# Patient Record
Sex: MALE | Race: WHITE | HISPANIC OR LATINO | Employment: FULL TIME | URBAN - METROPOLITAN AREA
[De-identification: names, ages, dates, MRNs, and addresses within clinical notes are randomized per-mention and may not be internally consistent; named-entity substitution may affect disease eponyms.]

---

## 2017-01-22 ENCOUNTER — HOSPITAL ENCOUNTER (EMERGENCY)
Facility: CLINIC | Age: 37
Discharge: HOME | End: 2017-01-22
Attending: EMERGENCY MEDICINE | Admitting: EMERGENCY MEDICINE
Payer: COMMERCIAL

## 2017-01-22 VITALS
OXYGEN SATURATION: 100 % | DIASTOLIC BLOOD PRESSURE: 82 MMHG | RESPIRATION RATE: 16 BRPM | TEMPERATURE: 97.8 F | SYSTOLIC BLOOD PRESSURE: 123 MMHG

## 2017-01-22 DIAGNOSIS — S46.002A ROTATOR CUFF INJURY, LEFT, INITIAL ENCOUNTER: ICD-10-CM

## 2017-01-22 DIAGNOSIS — M79.622 PAIN OF LEFT UPPER ARM: ICD-10-CM

## 2017-01-22 PROCEDURE — 93005 ELECTROCARDIOGRAM TRACING: CPT

## 2017-01-22 PROCEDURE — 99284 EMERGENCY DEPT VISIT MOD MDM: CPT

## 2017-01-22 RX ORDER — IBUPROFEN 600 MG/1
600 TABLET, FILM COATED ORAL 3 TIMES DAILY PRN
Qty: 30 TABLET | Refills: 0 | Status: SHIPPED | OUTPATIENT
Start: 2017-01-22 | End: 2018-07-04

## 2017-01-22 RX ORDER — METHOCARBAMOL 750 MG/1
750 TABLET, FILM COATED ORAL 3 TIMES DAILY PRN
Qty: 30 TABLET | Refills: 0 | Status: SHIPPED | OUTPATIENT
Start: 2017-01-22 | End: 2018-07-04

## 2017-01-22 ASSESSMENT — ENCOUNTER SYMPTOMS: MYALGIAS: 1

## 2017-01-22 NOTE — ED PROVIDER NOTES
History     Chief Complaint:  Left Arm Pain    HPI History obtained through the  phone.     Teddy Fisher is a 36 year old male who presents to the emergency department today for evaluation of left arm pain. He was seen on 11/6/16 for similar symptoms in the ED with a negative chest x-ray and negative left shoulder x-ray. The patient's left arm is having pain from the elbow up the arm into the shoulder and neck for the last 3 days. He denies any trauma or injuries to the arm recently. His pain has been intermittent for months now but seemed worse in the upper arm prompting him to come today. The patient has been taking Ibuprofen for the pain which helps but his stomach is sometimes irritated as a result. He has no past surgical history on the arm but did get an injection into the shoulder by orthopedics. The injection helped with his shoulder pain but not the arm pain. He denies chest pain or shortness of breath but does note some phlegm.  Has been working with physical therapy.     Allergies:  No Known Drug Allergies      Medications:    Ibuprofen    Past Medical History:    History reviewed. No pertinent past medical history.     Past Surgical History:    History reviewed. No pertinent past surgical history.     Family History:    History reviewed. No pertinent family history.      Social History:  The patient was accompanied to the ED by son.  Does not smoke    Review of Systems   Cardiovascular: Negative for chest pain.   Musculoskeletal: Positive for myalgias (left upper arm).   All other systems reviewed and are negative.    Physical Exam   Vitals:  Patient Vitals for the past 24 hrs:   BP Temp Temp src Heart Rate Resp SpO2   01/22/17 0850 123/82 mmHg 97.8  F (36.6  C) Oral 79 16 100 %       Physical Exam  Eyes:  Sclera white; Pupils are equal and round  ENT:    External ears and nares normal  CV:  Regular rate and rhythm, No murmur     No CW tenderness    No BLE edema  Resp:  Breath  sounds clear and equal bilaterally  GI:  Abdomen is soft, non-tender, non-distended    No rebound tenderness or peritoneal features  MS:  Moves all extremities    LUE: tenderness anterior shoulder, no rash or effusion.  Tenderness anterior and medial upper arm, no rash.  ROM limited - cannot abduct shoulder or flex beyond horizontal.  Distal CMS at elbow/wrist/hand intact    Neck: no midline or paraspinal tenderness    Back: no midline or paraspinal tenderness, no tenderness of palpable rotator cuff muscles  Skin:  Warm and dry  Neuro: Speech is normal and fluent. No apparent deficit.    Emergency Department Course     ECG:  ECG taken at 0845, ECG read at 0907  Normal sinus rhythm  Normal ECG  Rate 73 bpm. ID interval 128. QRS duration 94. QT/QTc 408/449. P-R-T axes 54 45 20.      Emergency Department Course:  Nursing notes and vitals reviewed.  I performed an exam of the patient as documented above.   I discussed the treatment plan with the patient. They expressed understanding of this plan and consented to discharge. They will be discharged home with instructions for care and follow up. In addition, the patient will return to the emergency department if their symptoms persist, worsen, if new symptoms arise or if there is any concern.  All questions were answered.   I personally reviewed the results with the patient and answered all related questions prior to discharge.    Impression & Plan      Medical Decision Making:  Patient is here for evaluation of persistent left shoulder and arm which he has been seen before. When he first presented there was concern that this could be an atypical presentation for cardiac pathology and EKG was ordered by the nurses. This returned normal and unchanged from prior. As we got more history with the  phone it is clear that he has longstanding shoulder issues and is simply here because pain is now also felt in arm and back. He has limited range of motion at the shoulder  consistent with rotator cuff dysfunction. I suspect that he is getting some muscle spasms around this area as well. He will be continuing Ibuprofen but we will be changing the dosing for him and adding Ranitidine for GI protection and Methocarbamol for muscle relaxation. He will have close follow up with his orthopedic group. There are no injuries to suggest fracture and exam is not consistent with dislocation. X-rays would not be helpful.     Diagnosis:    ICD-10-CM    1. Pain of left upper arm M79.622    2. Rotator cuff injury, left, initial encounter S46.002A      Disposition:   Discharge    Discharge Medications:  New Prescriptions    IBUPROFEN (ADVIL/MOTRIN) 600 MG TABLET    Take 1 tablet (600 mg) by mouth 3 times daily as needed for pain    METHOCARBAMOL (ROBAXIN) 750 MG TABLET    Take 1 tablet (750 mg) by mouth 3 times daily as needed (muscle pain/spasm)    RANITIDINE (ZANTAC) 150 MG TABLET    Take 1 tablet (150 mg) by mouth 2 times daily (protect stomach)     Scribe Disclosure:  I, Wale Kauffman, am serving as a scribe at 8:42 AM on 1/22/2017 to document services personally performed by Sonia Pollock, *, based on my observations and the provider's statements to me.   1/22/2017   [facility] EMERGENCY DEPARTMENT        Sonia Pollock MD  01/22/17 0949

## 2017-01-22 NOTE — ED AVS SNAPSHOT
[facility] Emergency Department    [address]    [facility] [address]    Phone:  [phone]    Fax:  [phone]                                       Teddy Fisher   MRN: [id number]    Department:  [facility] Emergency Department   Date of Visit:  1/22/2017           Patient Information     Date Of Birth          1980        Your diagnoses for this visit were:     Pain of left upper arm     Rotator cuff injury, left, initial encounter        You were seen by Sonia Pollock MD.      Follow-up Information     Follow up with Orthopedic Clinic.        Follow up with [facility] Emergency Department.    Specialty:  EMERGENCY MEDICINE    Why:  As needed, If symptoms worsen    Contact information:    [address]  [facility] [address] [phone]        Discharge Instructions       Prescription strength dosing instructions:  - 600mg ibuprofen (motrin, advil) every 6 hours and/or  - 650mg acetaminophen (tylenol) every 4 hours or 1000mg every 6 hours (maximum of 4000mg in 24 hours).  Acetaminophen is often in combination over the counter and prescription pain medications.  Be sure to include this in daily total.  - These work in different ways and are safe to take together        Discharge References/Attachments     ROTATOR CUFF INJURIES, UNDERSTANDING ([place])      24 Hour Appointment Hotline       To make an appointment at any [facility], call [phone] ([phone]). If you don't have a family doctor or clinic, we will help you find one. [facility] are conveniently located to serve the needs of you and your family.             Review of your medicines      START taking        Dose / Directions Last dose taken    methocarbamol 750 MG tablet   Commonly known as:  ROBAXIN   Dose:  750 mg   Quantity:  30 tablet        Take 1 tablet (750 mg) by mouth 3 times daily as needed (muscle pain/spasm)   Refills:   0        ranitidine 150 MG tablet   Commonly known as:  ZANTAC   Dose:  150 mg   Quantity:  30 tablet        Take 1 tablet (150 mg) by mouth 2 times daily (protect stomach)   Refills:  0          CONTINUE these medicines which may have CHANGED, or have new prescriptions. If we are uncertain of the size of tablets/capsules you have at home, strength may be listed as something that might have changed.        Dose / Directions Last dose taken    ibuprofen 600 MG tablet   Commonly known as:  ADVIL/MOTRIN   Dose:  600 mg   What changed:    - medication strength  - how much to take  - when to take this  - reasons to take this   Quantity:  30 tablet        Take 1 tablet (600 mg) by mouth 3 times daily as needed for pain   Refills:  0                Prescriptions were sent or printed at these locations (3 Prescriptions)                   Other Prescriptions                Printed at Department/Unit printer (3 of 3)         ibuprofen (ADVIL/MOTRIN) 600 MG tablet               ranitidine (ZANTAC) 150 MG tablet               methocarbamol (ROBAXIN) 750 MG tablet                Procedures and tests performed during your visit     EKG 12 lead      Orders Needing Specimen Collection     None      Pending Results     Date and Time Order Name Status Description    1/22/2017 0847 EKG 12 lead Preliminary             Pending Culture Results     No orders found from 1/21/2017 to 1/23/2017.             Test Results from your hospital stay            Clinical Quality Measure: Blood Pressure Screening     Your blood pressure was checked while you were in the emergency department today. The last reading we obtained was  BP: 123/82 mmHg . Please read the guidelines below about what these numbers mean and what you should do about them.  If your systolic blood pressure (the top number) is less than 120 and your diastolic blood pressure (the bottom number) is less than 80, then your blood pressure is normal. There is nothing more that you need  "to do about it.  If your systolic blood pressure (the top number) is 120-139 or your diastolic blood pressure (the bottom number) is 80-89, your blood pressure may be higher than it should be. You should have your blood pressure rechecked within a year by a primary care provider.  If your systolic blood pressure (the top number) is 140 or greater or your diastolic blood pressure (the bottom number) is 90 or greater, you may have high blood pressure. High blood pressure is treatable, but if left untreated over time it can put you at risk for heart attack, stroke, or kidney failure. You should have your blood pressure rechecked by a primary care provider within the next 4 weeks.  If your provider in the emergency department today gave you specific instructions to follow-up with your doctor or provider even sooner than that, you should follow that instruction and not wait for up to 4 weeks for your follow-up visit.        Thank you for choosing [facility]       Thank you for choosing [facility] for your care. Our goal is always to provide you with excellent care. Hearing back from our patients is one way we can continue to improve our services. Please take a few minutes to complete the written survey that you may receive in the mail after you visit with us. Thank you!        [employer] Information     [employer] lets you send messages to your doctor, view your test results, renew your prescriptions, schedule appointments and more. To sign up, go to [email or web address] . Click on \"Log in\" on the left side of the screen, which will take you to the Welcome page. Then click on \"Sign up Now\" on the right side of the page.     You will be asked to enter the access code listed below, as well as some personal information. Please follow the directions to create your username and password.     Your access code is: [email or web address]  Expires: 2017  2:20 PM     Your access code will  in 90 days. If you need help or a new code, " please call your [place] clinic or [phone].        Care EveryWhere ID     This is your Care EveryWhere ID. This could be used by other organizations to access your [facility] medical records  [id number]        After Visit Summary       This is your record. Keep this with you and show to your community pharmacist(s) and doctor(s) at your next visit.

## 2017-01-22 NOTE — DISCHARGE INSTRUCTIONS
Prescription strength dosing instructions:  - 600mg ibuprofen (motrin, advil) every 6 hours and/or  - 650mg acetaminophen (tylenol) every 4 hours or 1000mg every 6 hours (maximum of 4000mg in 24 hours).  Acetaminophen is often in combination over the counter and prescription pain medications.  Be sure to include this in daily total.  - These work in different ways and are safe to take together

## 2017-01-22 NOTE — ED AVS SNAPSHOT
[facility] Emergency Department    [address]    [facility] [address]    Phone:  [phone]    Fax:  [phone]                                       Teddy Fisher   MRN: [id number]    Department:  [facility] Emergency Department   Date of Visit:  1/22/2017           After Visit Summary Signature Page     I have received my discharge instructions, and my questions have been answered. I have discussed any challenges I see with this plan with the nurse or doctor.    ..........................................................................................................................................  Patient/Patient Representative Signature      ..........................................................................................................................................  Patient Representative Print Name and Relationship to Patient    ..................................................               ................................................  Date                                            Time    ..........................................................................................................................................  Reviewed by Signature/Title    ...................................................              ..............................................  Date                                                            Time

## 2017-01-22 NOTE — ED NOTES
Complains of left upper arm pain.  Denies injury.  Denies chest pain.  Patient was here in November for same complaint.  ABCDs intact.

## 2017-01-23 LAB — INTERPRETATION ECG - MUSE: NORMAL

## 2017-04-17 ENCOUNTER — ANESTHESIA (OUTPATIENT)
Dept: SURGERY | Facility: CLINIC | Age: 37
End: 2017-04-17
Payer: COMMERCIAL

## 2017-04-17 ENCOUNTER — ANESTHESIA EVENT (OUTPATIENT)
Dept: SURGERY | Facility: CLINIC | Age: 37
End: 2017-04-17
Payer: COMMERCIAL

## 2017-04-17 ENCOUNTER — HOSPITAL ENCOUNTER (OUTPATIENT)
Facility: CLINIC | Age: 37
Discharge: HOME | End: 2017-04-17
Attending: ORTHOPAEDIC SURGERY | Admitting: ORTHOPAEDIC SURGERY
Payer: COMMERCIAL

## 2017-04-17 VITALS
BODY MASS INDEX: 25.91 KG/M2 | DIASTOLIC BLOOD PRESSURE: 89 MMHG | TEMPERATURE: 97.7 F | HEART RATE: 68 BPM | WEIGHT: 171 LBS | RESPIRATION RATE: 14 BRPM | OXYGEN SATURATION: 97 % | HEIGHT: 68 IN | SYSTOLIC BLOOD PRESSURE: 129 MMHG

## 2017-04-17 DIAGNOSIS — M75.42 SHOULDER IMPINGEMENT SYNDROME, LEFT: Primary | ICD-10-CM

## 2017-04-17 PROCEDURE — 25800025 ZZH RX 258: Performed by: ORTHOPAEDIC SURGERY

## 2017-04-17 PROCEDURE — 37000009 ZZH ANESTHESIA TECHNICAL FEE, EACH ADDTL 15 MIN: Performed by: ORTHOPAEDIC SURGERY

## 2017-04-17 PROCEDURE — 40000171 ZZH STATISTIC PRE-PROCEDURE ASSESSMENT III: Performed by: ORTHOPAEDIC SURGERY

## 2017-04-17 PROCEDURE — 36000063 ZZH SURGERY LEVEL 4 EA 15 ADDTL MIN: Performed by: ORTHOPAEDIC SURGERY

## 2017-04-17 PROCEDURE — 71000012 ZZH RECOVERY PHASE 1 LEVEL 1 FIRST HR: Performed by: ORTHOPAEDIC SURGERY

## 2017-04-17 PROCEDURE — 37000008 ZZH ANESTHESIA TECHNICAL FEE, 1ST 30 MIN: Performed by: ORTHOPAEDIC SURGERY

## 2017-04-17 PROCEDURE — 25000128 H RX IP 250 OP 636: Performed by: PHYSICIAN ASSISTANT

## 2017-04-17 PROCEDURE — 25000128 H RX IP 250 OP 636: Performed by: NURSE ANESTHETIST, CERTIFIED REGISTERED

## 2017-04-17 PROCEDURE — 36000093 ZZH SURGERY LEVEL 4 1ST 30 MIN: Performed by: ORTHOPAEDIC SURGERY

## 2017-04-17 PROCEDURE — 25800025 ZZH RX 258: Performed by: NURSE ANESTHETIST, CERTIFIED REGISTERED

## 2017-04-17 PROCEDURE — 25000566 ZZH SEVOFLURANE, EA 15 MIN: Performed by: ORTHOPAEDIC SURGERY

## 2017-04-17 PROCEDURE — 25000125 ZZHC RX 250: Performed by: NURSE ANESTHETIST, CERTIFIED REGISTERED

## 2017-04-17 PROCEDURE — 71000027 ZZH RECOVERY PHASE 2 EACH 15 MINS: Performed by: ORTHOPAEDIC SURGERY

## 2017-04-17 PROCEDURE — 27210794 ZZH OR GENERAL SUPPLY STERILE: Performed by: ORTHOPAEDIC SURGERY

## 2017-04-17 RX ORDER — SODIUM CHLORIDE, SODIUM LACTATE, POTASSIUM CHLORIDE, CALCIUM CHLORIDE 600; 310; 30; 20 MG/100ML; MG/100ML; MG/100ML; MG/100ML
INJECTION, SOLUTION INTRAVENOUS CONTINUOUS PRN
Status: DISCONTINUED | OUTPATIENT
Start: 2017-04-17 | End: 2017-04-17

## 2017-04-17 RX ORDER — FENTANYL CITRATE 50 UG/ML
25-50 INJECTION, SOLUTION INTRAMUSCULAR; INTRAVENOUS
Status: DISCONTINUED | OUTPATIENT
Start: 2017-04-17 | End: 2017-04-17 | Stop reason: HOSPADM

## 2017-04-17 RX ORDER — MEPERIDINE HYDROCHLORIDE 25 MG/ML
12.5 INJECTION INTRAMUSCULAR; INTRAVENOUS; SUBCUTANEOUS
Status: DISCONTINUED | OUTPATIENT
Start: 2017-04-17 | End: 2017-04-17 | Stop reason: HOSPADM

## 2017-04-17 RX ORDER — FENTANYL CITRATE 50 UG/ML
INJECTION, SOLUTION INTRAMUSCULAR; INTRAVENOUS PRN
Status: DISCONTINUED | OUTPATIENT
Start: 2017-04-17 | End: 2017-04-17

## 2017-04-17 RX ORDER — CEFAZOLIN SODIUM 2 G/100ML
2 INJECTION, SOLUTION INTRAVENOUS
Status: COMPLETED | OUTPATIENT
Start: 2017-04-17 | End: 2017-04-17

## 2017-04-17 RX ORDER — GLYCOPYRROLATE 0.2 MG/ML
INJECTION, SOLUTION INTRAMUSCULAR; INTRAVENOUS PRN
Status: DISCONTINUED | OUTPATIENT
Start: 2017-04-17 | End: 2017-04-17

## 2017-04-17 RX ORDER — HYDROMORPHONE HYDROCHLORIDE 1 MG/ML
.3-.5 INJECTION, SOLUTION INTRAMUSCULAR; INTRAVENOUS; SUBCUTANEOUS EVERY 10 MIN PRN
Status: DISCONTINUED | OUTPATIENT
Start: 2017-04-17 | End: 2017-04-17 | Stop reason: HOSPADM

## 2017-04-17 RX ORDER — ONDANSETRON 4 MG/1
4 TABLET, ORALLY DISINTEGRATING ORAL EVERY 30 MIN PRN
Status: DISCONTINUED | OUTPATIENT
Start: 2017-04-17 | End: 2017-04-17 | Stop reason: HOSPADM

## 2017-04-17 RX ORDER — CEFAZOLIN SODIUM 1 G/3ML
1 INJECTION, POWDER, FOR SOLUTION INTRAMUSCULAR; INTRAVENOUS SEE ADMIN INSTRUCTIONS
Status: DISCONTINUED | OUTPATIENT
Start: 2017-04-17 | End: 2017-04-17 | Stop reason: HOSPADM

## 2017-04-17 RX ORDER — DEXAMETHASONE SODIUM PHOSPHATE 4 MG/ML
INJECTION, SOLUTION INTRA-ARTICULAR; INTRALESIONAL; INTRAMUSCULAR; INTRAVENOUS; SOFT TISSUE PRN
Status: DISCONTINUED | OUTPATIENT
Start: 2017-04-17 | End: 2017-04-17

## 2017-04-17 RX ORDER — ONDANSETRON 2 MG/ML
INJECTION INTRAMUSCULAR; INTRAVENOUS PRN
Status: DISCONTINUED | OUTPATIENT
Start: 2017-04-17 | End: 2017-04-17

## 2017-04-17 RX ORDER — PROPOFOL 10 MG/ML
INJECTION, EMULSION INTRAVENOUS PRN
Status: DISCONTINUED | OUTPATIENT
Start: 2017-04-17 | End: 2017-04-17

## 2017-04-17 RX ORDER — ONDANSETRON 2 MG/ML
4 INJECTION INTRAMUSCULAR; INTRAVENOUS EVERY 30 MIN PRN
Status: DISCONTINUED | OUTPATIENT
Start: 2017-04-17 | End: 2017-04-17 | Stop reason: HOSPADM

## 2017-04-17 RX ORDER — NALOXONE HYDROCHLORIDE 0.4 MG/ML
.1-.4 INJECTION, SOLUTION INTRAMUSCULAR; INTRAVENOUS; SUBCUTANEOUS
Status: DISCONTINUED | OUTPATIENT
Start: 2017-04-17 | End: 2017-04-17 | Stop reason: HOSPADM

## 2017-04-17 RX ORDER — SODIUM CHLORIDE, SODIUM LACTATE, POTASSIUM CHLORIDE, CALCIUM CHLORIDE 600; 310; 30; 20 MG/100ML; MG/100ML; MG/100ML; MG/100ML
INJECTION, SOLUTION INTRAVENOUS CONTINUOUS
Status: DISCONTINUED | OUTPATIENT
Start: 2017-04-17 | End: 2017-04-17 | Stop reason: HOSPADM

## 2017-04-17 RX ORDER — OXYCODONE AND ACETAMINOPHEN 5; 325 MG/1; MG/1
1-2 TABLET ORAL EVERY 4 HOURS PRN
Qty: 75 TABLET | Refills: 0 | Status: SHIPPED | OUTPATIENT
Start: 2017-04-17 | End: 2018-07-04

## 2017-04-17 RX ORDER — LABETALOL HYDROCHLORIDE 5 MG/ML
10 INJECTION, SOLUTION INTRAVENOUS
Status: DISCONTINUED | OUTPATIENT
Start: 2017-04-17 | End: 2017-04-17 | Stop reason: HOSPADM

## 2017-04-17 RX ORDER — LIDOCAINE HYDROCHLORIDE 10 MG/ML
INJECTION, SOLUTION INFILTRATION; PERINEURAL PRN
Status: DISCONTINUED | OUTPATIENT
Start: 2017-04-17 | End: 2017-04-17

## 2017-04-17 RX ADMIN — FENTANYL CITRATE 100 MCG: 50 INJECTION, SOLUTION INTRAMUSCULAR; INTRAVENOUS at 12:17

## 2017-04-17 RX ADMIN — PROPOFOL 200 MG: 10 INJECTION, EMULSION INTRAVENOUS at 12:17

## 2017-04-17 RX ADMIN — DEXAMETHASONE SODIUM PHOSPHATE 4 MG: 4 INJECTION, SOLUTION INTRA-ARTICULAR; INTRALESIONAL; INTRAMUSCULAR; INTRAVENOUS; SOFT TISSUE at 12:17

## 2017-04-17 RX ADMIN — ONDANSETRON 4 MG: 2 INJECTION INTRAMUSCULAR; INTRAVENOUS at 12:59

## 2017-04-17 RX ADMIN — LIDOCAINE HYDROCHLORIDE 40 MG: 10 INJECTION, SOLUTION INFILTRATION; PERINEURAL at 12:17

## 2017-04-17 RX ADMIN — CEFAZOLIN SODIUM 2 G: 2 INJECTION, SOLUTION INTRAVENOUS at 12:11

## 2017-04-17 RX ADMIN — MIDAZOLAM HYDROCHLORIDE 2 MG: 1 INJECTION, SOLUTION INTRAMUSCULAR; INTRAVENOUS at 12:11

## 2017-04-17 RX ADMIN — SODIUM CHLORIDE, POTASSIUM CHLORIDE, SODIUM LACTATE AND CALCIUM CHLORIDE: 600; 310; 30; 20 INJECTION, SOLUTION INTRAVENOUS at 12:11

## 2017-04-17 RX ADMIN — GLYCOPYRROLATE 0.2 MG: 0.2 INJECTION, SOLUTION INTRAMUSCULAR; INTRAVENOUS at 12:31

## 2017-04-17 NOTE — ANESTHESIA CARE TRANSFER NOTE
Patient: Teddy Fisher    Procedure(s):  Left Shoulder Arthroscopy, Subacromial Decompression, and mini open Distal Clavicle Excision    - Wound Class: I-Clean    Diagnosis: Impingement   Diagnosis Additional Information: No value filed.    Anesthesia Type:   General, Periph. Nerve Block for postop pain, ETT     Note:  Airway :Face Mask  Patient transferred to:PACU        Vitals: (Last set prior to Anesthesia Care Transfer)    CRNA VITALS  4/17/2017 1302 - 4/17/2017 1332      4/17/2017             Resp Rate (observed): (!)  1                Electronically Signed By: JESÚS Jain CRNA  April 17, 2017  1:32 PM

## 2017-04-17 NOTE — IP AVS SNAPSHOT
[facility] PreOP/PostOP    [address]    [facility] [address]    Phone:  [phone]    Fax:  [phone]                                       After Visit Summary   4/17/2017    Teddy Fisher    MRN: [id number]           After Visit Summary Signature Page     I have received my discharge instructions, and my questions have been answered. I have discussed any challenges I see with this plan with the nurse or doctor.    ..........................................................................................................................................  Patient/Patient Representative Signature      ..........................................................................................................................................  Patient Representative Print Name and Relationship to Patient    ..................................................               ................................................  Date                                            Time    ..........................................................................................................................................  Reviewed by Signature/Title    ...................................................              ..............................................  Date                                                            Time

## 2017-04-17 NOTE — BRIEF OP NOTE
[facility] Brief Operative Note    Pre-operative diagnosis: Impingement    Post-operative diagnosis Same , ac djd   Procedure: Procedure(s):  Left Shoulder Arthroscopy, Subacromial Decompression, and mini open Distal Clavicle Excision    - Wound Class: I-Clean   Surgeon(s): Surgeon(s) and Role:     * Eyal Billy MD - Primary     * Yuridia Shahid PA - Assisting   Estimated blood loss: * No values recorded between 4/17/2017 12:39 PM and 4/17/2017  1:15 PM *    Specimens: * No specimens in log *   Findings: impingement

## 2017-04-17 NOTE — IP AVS SNAPSHOT
MRN:[id number]                      After Visit Summary   4/17/2017    Teddy Fisher    MRN: [id number]           Thank you!     Thank you for choosing [facility] for your care. Our goal is always to provide you with excellent care. Hearing back from our patients is one way we can continue to improve our services. Please take a few minutes to complete the written survey that you may receive in the mail after you visit. If you would like to speak to someone directly about your visit please contact Patient Relations at [phone]. Thank you!          Patient Information     Date Of Birth          1980        About your hospital stay     You were admitted on:  April 17, 2017 You last received care in the:  [facility] PreOP/PostOP    You were discharged on:  April 17, 2017       Who to Call     For medical emergencies, please call 911.  For non-urgent questions about your medical care, please call your primary care provider or clinic, None  For questions related to your surgery, please call your surgery clinic        Attending Provider     Provider Specialty    Mouna Billy MD Orthopedics       Primary Care Provider    Physician No Ref-Primary       No address on file        After Care Instructions     Activity       Your activity upon discharge: sling for 5-7 days            Diet       Follow this diet upon discharge: regular            Wound care and dressings       Instructions to care for your wound at home: pod 3                  Follow-up Appointments     Follow-up and recommended labs and tests        With Tad in 2 weeks at office, call office to schedule PT, pendulums pod 1                  Further instructions from your care team       DR. MOUNA BILLY M.D.                  CLINIC PHONE NUMBER:  [phone]      SHOULDER SURGERY DISCHARGE INSTRUCTIONS    Following these home instructions will aid the healing process, prevent complications and increase  your comfort following your surgery.    1. Keep the dressing clean and dry until the time that your physician has instructed you to remove it.    2. Ice the shoulder over the next 24 - 48 hours and then as needed for comfort.    3. Wear the sling until the day after surgery and then follow your physician s recommendations regarding use of the sling.    4. Remain quiet and restful the day of surgery. Resume normal activities gradually over the next day or so as advised by your physician.    5. Exercise your arm only as instructed by your physician.      Please notify your doctor of any of the followin. Fever greater than 101 degrees  2. Excessive drainage or bleeding  3. Blue discoloration of the fingers or they are cold to the touch  4. Severe pain not relieved by your pain medication  5. Drainage that is green, yellow, thick white, or has a bad odor              HOME CARE INSTRUCTIONS AFTER REGIONAL ANESTHESIA      To do your surgical procedure, your doctor used regional anesthesia to  numb  your arm, leg, or foot. This type of anesthesia will not be completely worn off for 4-24 hours. You should be careful during this time not to injure your extremity.    As the anesthetic wears off, you may have a tingling and prickly sensation as the feeling starts to return. The amount of discomfort you may feel is unpredictable. Use your pain medication as prescribed or advised by your doctor.    1. Wear a sling until your arm is completely  awake .    2. Use crutches until your leg is  awake .    3. Avoid striking or bumping your arm, leg, or foot while it is numb.    4. Avoid extremes of hot or cold while it is numb.    5. Remain quiet and restful the day of surgery. Resume normal activities gradually over the next day or so as advised by your doctor.    6. Do not drive or operate any machinery until your extremity is fully  awake .        Please notify your doctor of any of the followin. There is excessive  bleeding or drainage.  2. There is increased swelling of the extremity making the dressing too tight, and this is not relieved by elevating extremity.  3. There is blue coloring to fingers/toes or they are cold to touch.  4. There is severe pain not relieved by your pain medication.  5. There is any numbness or tingling of the extremity the following day.  GENERAL ANESTHESIA OR SEDATION ADULT DISCHARGE INSTRUCTIONS   SPECIAL PRECAUTIONS FOR 24 HOURS AFTER SURGERY    IT IS NOT UNUSUAL TO FEEL LIGHT-HEADED OR FAINT, UP TO 24 HOURS AFTER SURGERY OR WHILE TAKING PAIN MEDICATION.  IF YOU HAVE THESE SYMPTOMS; SIT FOR A FEW MINUTES BEFORE STANDING AND HAVE SOMEONE ASSIST YOU WHEN YOU GET UP TO WALK OR USE THE BATHROOM.    YOU SHOULD REST AND RELAX FOR THE NEXT 24 HOURS AND YOU MUST MAKE ARRANGEMENTS TO HAVE SOMEONE STAY WITH YOU FOR AT LEAST 24 HOURS AFTER YOUR DISCHARGE.  AVOID HAZARDOUS AND STRENUOUS ACTIVITIES.  DO NOT MAKE IMPORTANT DECISIONS FOR 24 HOURS.    DO NOT DRIVE ANY VEHICLE OR OPERATE MECHANICAL EQUIPMENT FOR 24 HOURS FOLLOWING THE END OF YOUR SURGERY.  EVEN THOUGH YOU MAY FEEL NORMAL, YOUR REACTIONS MAY BE AFFECTED BY THE MEDICATION YOU HAVE RECEIVED.    DO NOT DRINK ALCOHOLIC BEVERAGES FOR 24 HOURS FOLLOWING YOUR SURGERY.    DRINK CLEAR LIQUIDS (APPLE JUICE, GINGER ALE, 7-UP, BROTH, ETC.).  PROGRESS TO YOUR REGULAR DIET AS YOU FEEL ABLE.    YOU MAY HAVE A DRY MOUTH, A SORE THROAT, MUSCLES ACHES OR TROUBLE SLEEPING.  THESE SHOULD GO AWAY AFTER 24 HOURS.    CALL YOUR DOCTOR FOR ANY OF THE FOLLOWING:  SIGNS OF INFECTION (FEVER, GROWING TENDERNESS AT THE SURGERY SITE, A LARGE AMOUNT OF DRAINAGE OR BLEEDING, SEVERE PAIN, FOUL-SMELLING DRAINAGE, REDNESS OR SWELLING.    IT HAS BEEN OVER 8 TO 10 HOURS SINCE SURGERY AND YOU ARE STILL NOT ABLE TO URINATE (PASS WATER).       Pending Results     No orders found from 4/15/2017 to 4/18/2017.            Admission Information     Date & Time Provider Department Dept. Phone  "   2017 Eyal Billy MD [facility] PreOP/PostOP [phone]      Your Vitals Were     Blood Pressure Pulse Temperature Respirations Height Weight    129/89 68 97.7  F (36.5  C) (Temporal) 14 1.727 m (5' 8\") 77.6 kg (171 lb)    Pulse Oximetry BMI (Body Mass Index)                97% 26 kg/m2          [employer] Information     [employer] lets you send messages to your doctor, view your test results, renew your prescriptions, schedule appointments and more. To sign up, go to [email or web address] . Click on \"Log in\" on the left side of the screen, which will take you to the Welcome page. Then click on \"Sign up Now\" on the right side of the page.     You will be asked to enter the access code listed below, as well as some personal information. Please follow the directions to create your username and password.     Your access code is: [email or web address]  Expires: 2017  1:50 PM     Your access code will  in 90 days. If you need help or a new code, please call your [place] clinic or [phone].        Care EveryWhere ID     This is your Care EveryWhere ID. This could be used by other organizations to access your [facility] medical records  [id number]           Review of your medicines      UNREVIEWED medicines. Ask your doctor about these medicines        Dose / Directions    ibuprofen 600 MG tablet   Commonly known as:  ADVIL/MOTRIN        Dose:  600 mg   Take 1 tablet (600 mg) by mouth 3 times daily as needed for pain   Quantity:  30 tablet   Refills:  0       methocarbamol 750 MG tablet   Commonly known as:  ROBAXIN        Dose:  750 mg   Take 1 tablet (750 mg) by mouth 3 times daily as needed (muscle pain/spasm)   Quantity:  30 tablet   Refills:  0       ranitidine 150 MG tablet   Commonly known as:  ZANTAC        Dose:  150 mg   Take 1 tablet (150 mg) by mouth 2 times daily (protect stomach)   Quantity:  30 tablet   Refills:  0         START taking        Dose / Directions    " oxyCODONE-acetaminophen 5-325 MG per tablet   Commonly known as:  PERCOCET   Used for:  Shoulder impingement syndrome, left        Dose:  1-2 tablet   Take 1-2 tablets by mouth every 4 hours as needed for moderate to severe pain   Quantity:  75 tablet   Refills:  0            Where to get your medicines      Some of these will need a paper prescription and others can be bought over the counter. Ask your nurse if you have questions.     Bring a paper prescription for each of these medications     oxyCODONE-acetaminophen 5-325 MG per tablet                Protect others around you: Learn how to safely use, store and throw away your medicines at www.disposemymeds.org.             Medication List: This is a list of all your medications and when to take them. Check marks below indicate your daily home schedule. Keep this list as a reference.      Medications           Morning Afternoon Evening Bedtime As Needed    ibuprofen 600 MG tablet   Commonly known as:  ADVIL/MOTRIN   Take 1 tablet (600 mg) by mouth 3 times daily as needed for pain                                methocarbamol 750 MG tablet   Commonly known as:  ROBAXIN   Take 1 tablet (750 mg) by mouth 3 times daily as needed (muscle pain/spasm)                                oxyCODONE-acetaminophen 5-325 MG per tablet   Commonly known as:  PERCOCET   Take 1-2 tablets by mouth every 4 hours as needed for moderate to severe pain                                ranitidine 150 MG tablet   Commonly known as:  ZANTAC   Take 1 tablet (150 mg) by mouth 2 times daily (protect stomach)                                          More Information        Después de pricilla mastectomía profiláctica  Los ejercicios de estiramiento para el hombro, tales marion los ejercicios pendulares, pueden mejorar root flexibilidad, aumentar root amplitud de movimiento y disminuir el dolor. Root médico o fisioterapeuta le ha recomendado que mary ejercicios pendulares para ayudarle a acelerar root  recuperación. Recuerde respirar normalmente y procure moverse de forma fluida y sin brusquedad.    Cómo hacer los ejercicios pendulares    Siga todas las instrucciones especiales que le hayan dado. Si tiene dolor, deje de hacer el ejercicio. Si sigue sintiendo dolor después de detenerse, llame a root médico o fisioterapeuta.    Comience los ejercicios pendulares con el brazo afectado tan pronto marion le haya indicado root médico.    Inclínese sobre el brazo carina, apoyando root peso en pricilla oh o silla.    Relaje el brazo del lado dolorido, dejándolo colgar verticalmente.    Comience a balancear lentamente el brazo relajado. Olaf círculos en un sentido y luego en el sentido opuesto. A continuación, muévalo hacia adelante y hacia atrás. Por último, balancéelo de lado a lado.    Olaf el ejercicio 3 veces al día yudith 5-10 minutos cada vez o según le indique el médico. Efectúe el movimiento por 1 minuto, luego cambie de sentido.  Cuidados en la casa    Póngase el cabestrillo marion le indiquen.    [place] calmantes para el dolor según le indique root médico.  Visitas de control  Programe pricilla visita de control según le indique el personal médico.     Cuándo debe obtener atención médica  Llame al 911 de inmediatosi tiene cualquiera de estos síntomas:    Dolor en el pecho    Falta de aliento (dificultad para respirar)  En otros casos, llame a root médico de inmediato si nota que tiene cualquiera de estos síntomas:    Fiebre de 100.4 F o más urszula, o escalofríos temblorosos    Dolor de hombro en aumento    Dolor que no se alivie con los medicamentos    Dolor o hinchazón del brazo del lado de la cirugía    Entumecimiento, hormigueo, o coloración melo-grisácea del brazo o los dedos del lado de la cirugía    Aumento de la hinchazón o del enrojecimiento alrededor de la incisión    Secreción o supuración alrededor de la incisión     3649-2351 [employer]. [address] [place] [id number]. Todos los derechos reservados.  Esta información no pretende sustituir la atención médica profesional. Sólo root médico puede diagnosticar y tratar un problema de batsheva.                Ejercicios de flexibilidad del hombro: Ejercicio del péndulo    Si mejora root flexibilidad, podrá disminuir root dolor. Hacer ejercicios de estiramiento también puede ayudar a aumentar root amplitud de movimientos sin dolor. Mientras se ejercita, respire normalmente y procure moverse con fluidez y regularidad.  Siga todas las instrucciones especiales que le den. Si tiene dolor, deje de hacer el ejercicio. Si sigue sintiendo dolor después de detenerse, llame a root proveedor de atención médica.    Inclínese sobre root brazo carina apoyando root peso en pricilla oh o silla.    Relaje el brazo del lado adolorido, dejándolo colgar verticalmente.    Comience a balancear lentamente el brazo relajado. Olaf círculos en un sentido, luego en sentido opuesto. A continuación muévalo hacia adelante y hacia atrás; por último, balancéelo de lado a lado.  Nota: Dedique unos 5 minutos a noble ejercicio, 3 veces al día. Efectúe el movimiento yudith 1 minuto y luego cambie de sentido.     0167-0292 [employer]. [address] [place] [address], [place] [address]. Todos los derechos reservados. Esta información no pretende sustituir la atención médica profesional. Sólo root médico puede diagnosticar y tratar un problema de batsheva.

## 2017-04-17 NOTE — DISCHARGE INSTRUCTIONS
DR. MOUNA BACK M.D.                  CLINIC PHONE NUMBER:  [phone]      SHOULDER SURGERY DISCHARGE INSTRUCTIONS    Following these home instructions will aid the healing process, prevent complications and increase your comfort following your surgery.    1. Keep the dressing clean and dry until the time that your physician has instructed you to remove it.    2. Ice the shoulder over the next 24 - 48 hours and then as needed for comfort.    3. Wear the sling until the day after surgery and then follow your physician s recommendations regarding use of the sling.    4. Remain quiet and restful the day of surgery. Resume normal activities gradually over the next day or so as advised by your physician.    5. Exercise your arm only as instructed by your physician.      Please notify your doctor of any of the followin. Fever greater than 101 degrees  2. Excessive drainage or bleeding  3. Blue discoloration of the fingers or they are cold to the touch  4. Severe pain not relieved by your pain medication  5. Drainage that is green, yellow, thick white, or has a bad odor              HOME CARE INSTRUCTIONS AFTER REGIONAL ANESTHESIA      To do your surgical procedure, your doctor used regional anesthesia to  numb  your arm, leg, or foot. This type of anesthesia will not be completely worn off for 4-24 hours. You should be careful during this time not to injure your extremity.    As the anesthetic wears off, you may have a tingling and prickly sensation as the feeling starts to return. The amount of discomfort you may feel is unpredictable. Use your pain medication as prescribed or advised by your doctor.    1. Wear a sling until your arm is completely  awake .    2. Use crutches until your leg is  awake .    3. Avoid striking or bumping your arm, leg, or foot while it is numb.    4. Avoid extremes of hot or cold while it is numb.    5. Remain quiet and restful the day of surgery. Resume normal activities  gradually over the next day or so as advised by your doctor.    6. Do not drive or operate any machinery until your extremity is fully  awake .        Please notify your doctor of any of the followin. There is excessive bleeding or drainage.  2. There is increased swelling of the extremity making the dressing too tight, and this is not relieved by elevating extremity.  3. There is blue coloring to fingers/toes or they are cold to touch.  4. There is severe pain not relieved by your pain medication.  5. There is any numbness or tingling of the extremity the following day.  GENERAL ANESTHESIA OR SEDATION ADULT DISCHARGE INSTRUCTIONS   SPECIAL PRECAUTIONS FOR 24 HOURS AFTER SURGERY    IT IS NOT UNUSUAL TO FEEL LIGHT-HEADED OR FAINT, UP TO 24 HOURS AFTER SURGERY OR WHILE TAKING PAIN MEDICATION.  IF YOU HAVE THESE SYMPTOMS; SIT FOR A FEW MINUTES BEFORE STANDING AND HAVE SOMEONE ASSIST YOU WHEN YOU GET UP TO WALK OR USE THE BATHROOM.    YOU SHOULD REST AND RELAX FOR THE NEXT 24 HOURS AND YOU MUST MAKE ARRANGEMENTS TO HAVE SOMEONE STAY WITH YOU FOR AT LEAST 24 HOURS AFTER YOUR DISCHARGE.  AVOID HAZARDOUS AND STRENUOUS ACTIVITIES.  DO NOT MAKE IMPORTANT DECISIONS FOR 24 HOURS.    DO NOT DRIVE ANY VEHICLE OR OPERATE MECHANICAL EQUIPMENT FOR 24 HOURS FOLLOWING THE END OF YOUR SURGERY.  EVEN THOUGH YOU MAY FEEL NORMAL, YOUR REACTIONS MAY BE AFFECTED BY THE MEDICATION YOU HAVE RECEIVED.    DO NOT DRINK ALCOHOLIC BEVERAGES FOR 24 HOURS FOLLOWING YOUR SURGERY.    DRINK CLEAR LIQUIDS (APPLE JUICE, GINGER ALE, 7-UP, BROTH, ETC.).  PROGRESS TO YOUR REGULAR DIET AS YOU FEEL ABLE.    YOU MAY HAVE A DRY MOUTH, A SORE THROAT, MUSCLES ACHES OR TROUBLE SLEEPING.  THESE SHOULD GO AWAY AFTER 24 HOURS.    CALL YOUR DOCTOR FOR ANY OF THE FOLLOWING:  SIGNS OF INFECTION (FEVER, GROWING TENDERNESS AT THE SURGERY SITE, A LARGE AMOUNT OF DRAINAGE OR BLEEDING, SEVERE PAIN, FOUL-SMELLING DRAINAGE, REDNESS OR SWELLING.    IT HAS BEEN OVER 8 TO  10 HOURS SINCE SURGERY AND YOU ARE STILL NOT ABLE TO URINATE (PASS WATER).

## 2017-04-17 NOTE — ANESTHESIA POSTPROCEDURE EVALUATION
Patient: Teddy Fisher    Procedure(s):  Left Shoulder Arthroscopy, Subacromial Decompression, and mini open Distal Clavicle Excision    - Wound Class: I-Clean    Diagnosis:Impingement   Diagnosis Additional Information: Pre-operative diagnosis: Impingement   Post-operative diagnosis Same , ac djd  Procedure: Procedure(s):  Left Shoulder Arthroscopy, Subacromial Decompression, and mini open Distal Clavicle Excision   - Wound Class: I-Clean        Anesthesia Type:  General, Periph. Nerve Block for postop pain, ETT    Note:  Anesthesia Post Evaluation    Patient location during evaluation: PACU  Patient participation: Able to fully participate in evaluation  Level of consciousness: awake and alert  Pain management: adequate  Airway patency: patent  Cardiovascular status: acceptable  Respiratory status: acceptable  Hydration status: acceptable  PONV: none     Anesthetic complications: None          Last vitals:  Vitals:    04/17/17 1347 04/17/17 1400 04/17/17 1434   BP: 118/81  129/89   Pulse:      Resp: 30 15 14   Temp:   97.7  F (36.5  C)   SpO2: 100% 98% 97%         Electronically Signed By: Monico Feldman MD  April 17, 2017  4:20 PM

## 2017-04-17 NOTE — ANESTHESIA PREPROCEDURE EVALUATION
Anesthesia Evaluation     . Pt has had prior anesthetic. Type: General    No history of anesthetic complications          ROS/MED HX    ENT/Pulmonary:  - neg pulmonary ROS     Neurologic:  - neg neurologic ROS     Cardiovascular:  - neg cardiovascular ROS       METS/Exercise Tolerance:     Hematologic:  - neg hematologic  ROS       Musculoskeletal:   (+) , , other musculoskeletal-       GI/Hepatic:  - neg GI/hepatic ROS       Renal/Genitourinary:  - ROS Renal section negative       Endo:  - neg endo ROS       Psychiatric:  - neg psychiatric ROS       Infectious Disease:  - neg infectious disease ROS       Malignancy:         Other:    - neg other ROS                 Physical Exam  Normal systems: cardiovascular, pulmonary and dental    Airway   Mallampati: II  TM distance: >3 FB  Neck ROM: full    Dental     Cardiovascular       Pulmonary                     Anesthesia Plan      History & Physical Review  History and physical reviewed and following examination; no interval change.    ASA Status:  1 .    NPO Status:  > 8 hours    Plan for General, Periph. Nerve Block for postop pain and ETT with Intravenous induction. Maintenance will be Balanced.    PONV prophylaxis:  Ondansetron (or other 5HT-3) and Dexamethasone or Solumedrol       Postoperative Care  Postoperative pain management:  IV analgesics, Oral pain medications and Peripheral nerve block (Single Shot).      Consents  Anesthetic plan, risks, benefits and alternatives discussed with:  Patient..                          .

## 2017-04-17 NOTE — ANESTHESIA PROCEDURE NOTES
Peripheral nerve/Neuraxial procedure note : Brachial plexus (via interscalene approach)  Pre-Procedure  Performed by ADELITA CHRISTIANSON  Referred by NAZANIN  Location: pre-op    Procedure Times:4/17/2017 11:46 AM and 4/17/2017 11:55 AM  Pre-Anesthestic Checklist: patient identified, IV checked, site marked, risks and benefits discussed, informed consent, monitors and equipment checked, pre-op evaluation, at physician/surgeon's request and post-op pain management    Timeout  Correct Patient: Yes   Correct Procedure: Yes   Correct Site: Yes   Correct Laterality: Yes   Correct Position: Yes   Site Marked: Yes   .   Procedure Documentation    .    Procedure:    Brachial plexus (via interscalene approach).  Local skin infiltrated with mL of 1% lidocaine.     Ultrasound used to identify targeted nerve, plexus, or vascular marker and placed a needle adjacent to it., Ultrasound was used to visualize the spread of the anesthetic in close proximity to the above stated nerve.   Patient Prep;mask, sterile gloves, povidone-iodine 7.5% surgical scrub.  Nerve Stim: Initial Level 0.44 mA. Lowest motor response mA..  Needle: (22 G. 2 in). . Spinal Needle: . . Insertion Method: Single Shot.     Assessment/Narrative  Paresthesias: No.  Injection made incrementally with aspirations every 5 mL..  The placement was negative for: blood aspirated, painful injection and site bleeding.  Bolus given via needle..   Secured via.   Complications: none. Test dose of mL at. Test dose negative for signs of intravascular, subdural or intrathecal injection. Comments:  The surgeon has given a verbal order transferring care of this patient to me for the performance of a regional analgesia block for post-op pain control. It is requested of me because I am uniquely trained and qualified to perform this block and the surgeon is neither trained nor qualified to perform this procedure.    30 ml 0.5% bupivacaine with 1:200k epi

## 2017-04-17 NOTE — PROGRESS NOTES
SPIRITUAL HEALTH SERVICES Progress Note  On license of [facility] Pre-Op    Visited pt Teddy per his request for  support before his surgery.  Teddy's wife Miroslava and their baby were present as well as the Yi interpretor.  Teddy reported that he is having shoulder surgery and that he is feeling good about it.  He has family support in the areas as well as [place] and [facility].  Teddy is Spiritism and is affiliated with [employer].  He requested prayer.    Plan:  Teddy expects to return home after his surgery; thus, no further plans.    Gunner Lozoya M.Div., [facility]  Staff   Pager [phone]

## 2017-04-20 NOTE — OP NOTE
DATE OF PROCEDURE:  04/17/2017      PREOPERATIVE DIAGNOSIS:  Left shoulder impingement syndrome with symptomatic acromioclavicular joint arthrosis.      POSTOPERATIVE DIAGNOSIS:  Left shoulder impingement syndrome with symptomatic acromioclavicular joint arthrosis.      PROCEDURE:  Left shoulder examination under anesthesia with left shoulder arthroscopy, arthroscopic subacromial decompression, mini open distal clavicle excision.      SURGEON:  Eyal Billy MD      ASSISTANT:  Lon Shahid PA-C      ANESTHESIA:  General and regional.      ESTIMATED BLOOD LOSS:  Less than 20 mL.      COMPLICATIONS:  None apparent.      INDICATIONS:  Teddy Fisher is a 36-year-old male who has had longstanding left shoulder pain consistent with impingement syndrome, acromioclavicular joint arthrosis.  Underwent physical therapy and injections which gave him some minimal relief.  His symptoms reoccur and he elected for surgical operation based on MRI findings.  Risks, benefits and alternatives were discussed.  Consent signed.      DESCRIPTION OF PROCEDURE:  The patient was brought to the operating room, placed supine on the table, general endotracheal was administered.  He was placed in the beach chair position, EUA confirmed full passive range of motion and no significant anterior, posterior instability.  With that completed, he was prepped and draped in the usual sterile fashion for shoulder arthroscopy and the landmarks were identified.  Timeout confirmed the side.  We then injected 30 mL of saline in the shoulder, placed the scope from anterior to posterior and anterior working portal.  We probed the rotator cuff.  There was no significant damage biceps appeared healthy with minimal damage and no significant labral tear and the ligaments were intact.  With that completed, we then brought the scope up into the subacromial space and established a lateral working portal.  We used a combination of our synovial resector and the  ablator to remove the bursa off the rotator cuff.  No significant tear was identified but he did have a small spur anteriorly.  Acromionizer was used to debride this down, removing about 2-3 mm of bone.  With that completed, we then turned our attention to the clavicle.  We removed our arthroscopic instrumentation made a 2 cm incision directly over the AC joint and we dissected down to the capsule.  The capsule was T'd open and the distal cm of the clavicle was resected.  This was then copiously irrigated.  We then closed the capsule with #1 Vicryl and did a final irrigation, closed the incision in standard fashion nylon for the scope portals.  Sterile dressings were applied.  He was placed into a sling and brought to PACU in stable condition.  Needle and sponge correct.      PLAN:  The patient will begin pendulums postop day 1.  Physical therapy in 5-7 days, tabletop work as tolerates.  Percocet will be given for pain.         MOUNA BACK MD             D: 2017 14:01   T: 2017 22:43   MT: PREET#[id number]      Name:     PRISCILLA ANTONIO   MRN:      [id number]        Account:        [id number]   :      1980           Procedure Date: 2017      Document: [id number]       cc: Mouna Back MD

## 2018-07-04 ENCOUNTER — HOSPITAL ENCOUNTER (EMERGENCY)
Facility: CLINIC | Age: 38
Discharge: HOME | End: 2018-07-04
Attending: EMERGENCY MEDICINE | Admitting: EMERGENCY MEDICINE
Payer: COMMERCIAL

## 2018-07-04 ENCOUNTER — APPOINTMENT (OUTPATIENT)
Dept: GENERAL RADIOLOGY | Facility: CLINIC | Age: 38
End: 2018-07-04
Attending: EMERGENCY MEDICINE
Payer: COMMERCIAL

## 2018-07-04 VITALS
TEMPERATURE: 98.6 F | WEIGHT: 170 LBS | OXYGEN SATURATION: 100 % | BODY MASS INDEX: 25.85 KG/M2 | SYSTOLIC BLOOD PRESSURE: 120 MMHG | DIASTOLIC BLOOD PRESSURE: 79 MMHG | HEART RATE: 67 BPM | RESPIRATION RATE: 18 BRPM

## 2018-07-04 DIAGNOSIS — S92.254A CLOSED NONDISPLACED FRACTURE OF NAVICULAR BONE OF RIGHT FOOT, INITIAL ENCOUNTER: ICD-10-CM

## 2018-07-04 PROCEDURE — 73610 X-RAY EXAM OF ANKLE: CPT | Mod: RT

## 2018-07-04 PROCEDURE — 73630 X-RAY EXAM OF FOOT: CPT | Mod: RT

## 2018-07-04 PROCEDURE — 29515 APPLICATION SHORT LEG SPLINT: CPT | Mod: RT

## 2018-07-04 PROCEDURE — 99284 EMERGENCY DEPT VISIT MOD MDM: CPT | Mod: 25

## 2018-07-04 ASSESSMENT — ENCOUNTER SYMPTOMS: ARTHRALGIAS: 1

## 2018-07-04 NOTE — ED AVS SNAPSHOT
[facility] Emergency Department    [address]    [facility] [address]    Phone:  [phone]    Fax:  [phone]                                       Teddy Fisher   MRN: [id number]    Department:  [facility] Emergency Department   Date of Visit:  7/4/2018           Patient Information     Date Of Birth          1980        Your diagnoses for this visit were:     Closed nondisplaced fracture of navicular bone of right foot, initial encounter        You were seen by Anali Souza MD.      Follow-up Information     Follow up with [facility]. Go in 2 days.    Contact information:    [address] [place] [address], [facility] [address]  [facility] [address]  [phone]          Discharge Instructions         Fractura de pie  Tiene un hueso roto (fracturado) en el pie. Addis le causará dolor, hinchazón y, en ocasiones, moretones. El hueso necesitará entre cuatro y seis semanas para soldarse. Para pricilla fractura de pie, puede que le den un zapato especial, pricilla férula, un yeso o pricilla bota ortopédica.  Cuidados en la casa  Siga estos consejos para cuidar de usted en root casa:    Puede que le den pricilla férula, un yeso, un zapato o bota ortopédicos para evitar que se mueva la warren de la lesión. A menos que le indiquen otra cosa, use muletas o pricilla andadera. No ponga peso sobre el pie lesionado hasta que root proveedor de atención médica le diga que puede hacerlo. (Puede alquilar muletas y pricilla andadera en muchas farmacias y tiendas de insumos quirúrgicos u ortopédicos). No apoye peso sobre pricilla férula porque podría romperse.    Mantenga la pierna en alto para reducir el dolor y la hinchazón. Para dormir, coloque pricilla almohada debajo de la pierna afectada. Al sentarse, apoye la pierna sobre algún soporte para que quede a la misma altura que root cintura. Es muy importante que mary eso yudith los primeros dos días (48 horas).    Coloque pricilla compresa de hielo sobre la warren  lesionada. Hágalo yudith 20 minutos cada pricilla o dos horas el primer día para que le alivie el dolor. Para formar pricilla compresa de hielo, puede envolver con pricilla toalla delgada pricilla bolsa plástica con cubos de hielo. Puede colocar la compresa de hielo directamente sobre la férula o el yeso. A menos que le indiquen otra cosa, puede abrir la bota o el zapato ortopédicos para aplicarse la compresa de hielo. Siga usando la compresa de hielo cortney o cuatro veces al día en los dos aaron siguientes. Luego, úsela cuando lo necesite para aliviar el dolor y la inflamación.    La férula, el yeso, la bota o el zapato ortopédicos deben estar siempre secos. Cuando se bañe, cúbralos con pricilla bolsa plástica vickie cerrada en la parte superior con cinta adhesiva. Si la férula, el yeso o la bota de fibra de john se humedecen, puede secarlos con un secador para el uj. A menos que le indiquen otra cosa, puede sacarse la bota o el zapato ortopédicos para bañarse.    Puede usar acetaminofén o ibuprofeno para controlar el dolor, a menos que le hayan recetado otro medicamento para calmar el dolor. Si tiene pricilla enfermedad crónica del hígado o de los riñones, consulte a root proveedor antes de usar estos medicamentos. También hable con root proveedor si tiene pricilla úlcera de estómago o sangrado gastrointestinal.    No ponga cremas ni objetos dentro del yeso si le pica.  Visita de control  Programe pricilla visita de control con root proveedor de atención médica en pricilla semana, o según le hayan indicado. Es para asegurarse de que el hueso está recuperándose jamila. Si le colocaron pricilla férula, quizás se la cambien por un yeso o pricilla bota ortopédica en la próxima visita de control.  Si le tomaron radiografías, las evaluará un radiólogo. Le informarán de los nuevos hallazgos que puedan afectar root atención médica.   Cuándo debe buscar atención médica?  Llame a root proveedor de atención médica de inmediato si tiene cualquiera de los siguientes síntomas:    El yeso  se agrieta    El yeso o la férula (de yeso) se humedecen o se ablandan    La férula o el yeso de fibra de john permanecen húmedos por más de 24 horas    Siente mal olor que viene del yeso o el líquido de la herida gaetano el yeso    Siente más dolor debajo del yeso o la férula, o siente que el yeso o la férula le quedan más ajustados    Se le hinchan los dedos, o los siente fríos o entumecidos, se brie azulados, o siente cosquilleo en camila warren    No puede  los dedos de los pies    La piel alrededor del yeso se ve enrojecida    Fiebre de 101  F (38.3  C) o más, o según le haya indicado root proveedor de atención médica  Date Last Reviewed: 2/15/2015    [id number] [employer]. [address] [place] [address]. Todos los derechos reservados. Esta información no pretende sustituir la atención médica profesional. Sólo root médico puede diagnosticar y tratar un problema de batsheva.          24 Hour Appointment Hotline       To make an appointment at any [facility] clinic, call [phone] ([phone]). If you don't have a family doctor or clinic, we will help you find one. [facility] are conveniently located to serve the needs of you and your family.             Review of your medicines      Notice     You have not been prescribed any medications.            Procedures and tests performed during your visit     Foot  XR, G/E 3 views, right    XR Ankle Right G/E 3 Views      Orders Needing Specimen Collection     None      Pending Results     Date and Time Order Name Status Description    7/4/2018 1735 XR Ankle Right G/E 3 Views Preliminary     7/4/2018 1735 Foot  XR, G/E 3 views, right Preliminary             Pending Culture Results     No orders found from 7/2/2018 to 7/5/2018.            Pending Results Instructions     If you had any lab results that were not finalized at the time of your Discharge, you can call the ED Lab Result RN at [phone]. You will be contacted by this team for  any positive Lab results or changes in treatment. The nurses are available 7 days a week from 10A to 6:30P.  You can leave a message 24 hours per day and they will return your call.        Test Results From Your Hospital Stay        7/4/2018  6:34 PM      Narrative     FOOT RIGHT THREE OR MORE VIEWS, ANKLE RIGHT THREE OR MORE VIEWS    7/4/2018 6:17 PM     HISTORY: Fall.     COMPARISON: None.     FINDINGS:   Foot: There is ossification adjacent to the medial navicular which  likely represents accessory navicular although a subtle fracture of  the medial navicular could have a similar appearance. No other  evidence for fracture is seen. Joint spaces are grossly  well-maintained. No malalignment. Mild enthesopathic changes are seen  at the plantar aponeurosis origin of the calcaneus.    Ankle: Lucency is seen in the medial navicular which could represent a  subtle medial navicular fracture without significant displacement but  this could also represent the lucency seen at the junction between an  accessory navicular bone and the navicular. Joint spaces are  well-maintained. Mild enthesopathic changes are seen at the Achilles  tendon insertion into and at plantar aponeurosis origin of the  calcaneus.        Impression     IMPRESSION:  1. Question subtle medial navicular fracture without significant  displacement versus accessory navicular adjacent to the navicular.  2. No fracture or malalignment is otherwise seen.  3. Mild enthesopathic changes of the calcaneus.         7/4/2018  6:34 PM      Narrative     FOOT RIGHT THREE OR MORE VIEWS, ANKLE RIGHT THREE OR MORE VIEWS    7/4/2018 6:17 PM     HISTORY: Fall.     COMPARISON: None.     FINDINGS:   Foot: There is ossification adjacent to the medial navicular which  likely represents accessory navicular although a subtle fracture of  the medial navicular could have a similar appearance. No other  evidence for fracture is seen. Joint spaces are grossly  well-maintained. No  malalignment. Mild enthesopathic changes are seen  at the plantar aponeurosis origin of the calcaneus.    Ankle: Lucency is seen in the medial navicular which could represent a  subtle medial navicular fracture without significant displacement but  this could also represent the lucency seen at the junction between an  accessory navicular bone and the navicular. Joint spaces are  well-maintained. Mild enthesopathic changes are seen at the Achilles  tendon insertion into and at plantar aponeurosis origin of the  calcaneus.        Impression     IMPRESSION:  1. Question subtle medial navicular fracture without significant  displacement versus accessory navicular adjacent to the navicular.  2. No fracture or malalignment is otherwise seen.  3. Mild enthesopathic changes of the calcaneus.                Clinical Quality Measure: Blood Pressure Screening     Your blood pressure was checked while you were in the emergency department today. The last reading we obtained was  BP: 120/79 . Please read the guidelines below about what these numbers mean and what you should do about them.  If your systolic blood pressure (the top number) is less than 120 and your diastolic blood pressure (the bottom number) is less than 80, then your blood pressure is normal. There is nothing more that you need to do about it.  If your systolic blood pressure (the top number) is 120-139 or your diastolic blood pressure (the bottom number) is 80-89, your blood pressure may be higher than it should be. You should have your blood pressure rechecked within a year by a primary care provider.  If your systolic blood pressure (the top number) is 140 or greater or your diastolic blood pressure (the bottom number) is 90 or greater, you may have high blood pressure. High blood pressure is treatable, but if left untreated over time it can put you at risk for heart attack, stroke, or kidney failure. You should have your blood pressure rechecked by a primary  "care provider within the next 4 weeks.  If your provider in the emergency department today gave you specific instructions to follow-up with your doctor or provider even sooner than that, you should follow that instruction and not wait for up to 4 weeks for your follow-up visit.        Thank you for choosing [facility]       Thank you for choosing [facility] for your care. Our goal is always to provide you with excellent care. Hearing back from our patients is one way we can continue to improve our services. Please take a few minutes to complete the written survey that you may receive in the mail after you visit with us. Thank you!        [employer] Information     [employer] lets you send messages to your doctor, view your test results, renew your prescriptions, schedule appointments and more. To sign up, go to [email or web address] . Click on \"Log in\" on the left side of the screen, which will take you to the Welcome page. Then click on \"Sign up Now\" on the right side of the page.     You will be asked to enter the access code listed below, as well as some personal information. Please follow the directions to create your username and password.     Your access code is: [email or web address]  Expires: 10/2/2018  7:13 PM     Your access code will  in 90 days. If you need help or a new code, please call your [facility] clinic or [phone].        Care EveryWhere ID     This is your Care EveryWhere ID. This could be used by other organizations to access your [facility] medical records  [id number]        Equal Access to Services     CAMRYN RODRIGUEZ : Hadii hiram cardenaso Sojennifer, waaxda luqadaha, qaybta kaalmada silevstre mays . So [facility] [phone].    ATENCIÓN: Si habla español, tiene a root disposición servicios gratuitos de asistencia lingüística. Llame al [phone].    We comply with applicable federal civil rights laws and [place] laws. We do not discriminate on the basis of race, color, " national origin, age, disability, sex, sexual orientation, or gender identity.            After Visit Summary       This is your record. Keep this with you and show to your community pharmacist(s) and doctor(s) at your next visit.

## 2018-07-04 NOTE — ED AVS SNAPSHOT
[facility] Emergency Department    [address]    [facility] [address]    Phone:  [phone]    Fax:  [phone]                                       Teddy Fisher   MRN: [id number]    Department:  [facility] Emergency Department   Date of Visit:  7/4/2018           After Visit Summary Signature Page     I have received my discharge instructions, and my questions have been answered. I have discussed any challenges I see with this plan with the nurse or doctor.    ..........................................................................................................................................  Patient/Patient Representative Signature      ..........................................................................................................................................  Patient Representative Print Name and Relationship to Patient    ..................................................               ................................................  Date                                            Time    ..........................................................................................................................................  Reviewed by Signature/Title    ...................................................              ..............................................  Date                                                            Time

## 2018-07-04 NOTE — ED PROVIDER NOTES
History     Chief Complaint:  Foot and Ankle Pain     HPI The history is obtained through Icelandic language interpretations provided by the patient's son.     Teddy Fisher is a 38 year old male who presents accompanied by his son for evaluation of foot and ankle pain. Approximately three weeks ago, the patient was walking outside in the dark when he tripped and twisted his right foot inward. Since then, the patient has developed pain in his right ankle and over the bottom of his right foot. This pain is worse with movement and weight bearing. He has been taking Ibuprofen with limited improvement of his pain. He has not been evaluated regarding this injury. Today, due to his persistent pain, the patient came into the ED for evaluation.     Allergies:  NKDA      Medications:    The patient is not currently taking any prescribed medications.      Past Medical History:    History of blood transfusion     Past Surgical History:    Cosmetic surgery, facial reconstruction after trauma  Arthroscopy shoulder, open distal clavicle repair, combined, left     Family History:    History reviewed. No pertinent family history.     Social History:  Tobacco use:    Never smoker   Alcohol use:    Negative  Marital status:       Accompanied to ED by:  Son      Review of Systems   Musculoskeletal: Positive for arthralgias (right ankle and foot ).   All other systems reviewed and are negative.    Physical Exam   First Vitals:  BP: 120/79  Pulse: 67  Temp: 98.6  F (37  C)  Resp: 18  Weight: 77.1 kg (170 lb)  SpO2: 100 %      Physical Exam  General- alert, cooperative  Pulm- normal respiratory effort, no respiratory distress  Msk-            RLE: 2+ pulses, sensation to light touch intact, no wounds or abrasions   ROM normal without difficulty, 5/5 strength. TTP top of foot diffusely and medial malleolus  without swelling or bruising or redness. Weightbearing is normal.           LLE: 2+ pulses, sensation intact to  light touch, no wounds or abrasions   ROM normal without difficulty, 5/5 strength  Skin- no cyanosis or edema, no swelling, no rash or petechiae  Psych- normal mood and affect, normal behavior    Emergency Department Course     Imaging:  Radiographic findings were communicated with the patient and family who voiced understanding of the findings.    XR Ankle, Right:  IMPRESSION:  1. Question subtle medial navicular fracture without significant displacement versus accessory navicular adjacent to the navicular.  2. No fracture or malalignment is otherwise seen.  3. Mild enthesopathic changes of the calcaneus.  Preliminary report per radiology.     XR Foot, Right:  IMPRESSION:  1. Question subtle medial navicular fracture without significant displacement versus accessory navicular adjacent to the navicular.  2. No fracture or malalignment is otherwise seen.  3. Mild enthesopathic changes of the calcaneus.  Preliminary report per radiology.     Emergency Department Course:  Nursing notes and vitals reviewed.  1734: I performed an exam of the patient as documented above.     1843: I updated and reassessed the patient.     Findings and plan explained to the Patient and son. Patient discharged home with instructions regarding supportive care, medications, and reasons to return. The importance of close follow-up was reviewed.     Impression & Plan      Medical Decision Making:  Teddy Fisher is a 38 year old male who presents for evaluation of right foot pain after twisting his right foot and ankle three weeks ago. CMS is intact distally in the extremity.  Xrays reveal a fracture that does not need reduction at this time. There is no indication for ortho consultation from the ED. Rather, close follow-up is indicated in the next days.  CAM boot for home.  The patients head to toe trauma exam is otherwise normal at this time and no further trauma workup is needed as I believe there is no signs of serious head, neck,  chest, spinal, extremity or abdominal injuries.     Diagnosis:    ICD-10-CM   1. Closed nondisplaced fracture of navicular bone of right foot, initial encounter S92.254A       Disposition:  Discharged to home.         I, Bib Churchill, am serving as a scribe at 5:34 PM on 7/4/2018 to document services personally performed by Dr. Souza, based on my observations and the provider's statements to me.    [facility] [place] EMERGENCY DEPARTMENT       Anali Souza MD  07/04/18 1909

## 2018-07-04 NOTE — ED TRIAGE NOTES
Pt tripped in the dark a few weeks ago and sprained both feet.  Pt now walking more and having increased pain in bilateral feet.

## 2018-07-04 NOTE — LETTER
July 4, 2018      To Whom It May Concern:      Teddy Fisher was seen in our Emergency Department today, 07/04/18.  He is to be off work for 2 days.  He should be allowed to wear his boot for work due to his injury.    Sincerely,        Anali Souza MD

## 2018-07-04 NOTE — DISCHARGE INSTRUCTIONS
Fractura de pie  Tiene un hueso roto (fracturado) en el pie. [place] le causará dolor, hinchazón y, en ocasiones, moretones. El hueso necesitará entre cuatro y seis semanas para soldarse. Para pricilla fractura de pie, puede que le den un zapato especial, pricilla férula, un yeso o pricilla bota ortopédica.  Cuidados en la casa  Siga estos consejos para cuidar de usted en root casa:    Puede que le den pricilla férula, un yeso, un zapato o bota ortopédicos para evitar que se mueva la warren de la lesión. A menos que le indiquen otra cosa, use muletas o pricilla andadera. No ponga peso sobre el pie lesionado hasta que root proveedor de atención médica le diga que puede hacerlo. (Puede alquilar muletas y pricilla andadera en muchas farmacias y tiendas de insumos quirúrgicos u ortopédicos). No apoye peso sobre pricilla férula porque podría romperse.    Mantenga la pierna en alto para reducir el dolor y la hinchazón. Para dormir, coloque pricilla almohada debajo de la pierna afectada. Al sentarse, apoye la pierna sobre algún soporte para que quede a la misma altura que root cintura. Es muy importante que mary eso yudith los primeros dos días (48 horas).    Coloque pricilla compresa de hielo sobre la warren lesionada. Hágalo yudith 20 minutos cada pricilla o dos horas el primer día para que le alivie el dolor. Para formar pricilla compresa de hielo, puede envolver con pricilla toalla delgada pricilla bolsa plástica con cubos de hielo. Puede colocar la compresa de hielo directamente sobre la férula o el yeso. A menos que le indiquen otra cosa, puede abrir la bota o el zapato ortopédicos para aplicarse la compresa de hielo. Siga usando la compresa de hielo cortney o cuatro veces al día en los dos aaron siguientes. Luego, úsela cuando lo necesite para aliviar el dolor y la inflamación.    La férula, el yeso, la bota o el zapato ortopédicos deben estar siempre secos. Cuando se bañe, cúbralos con pricilla bolsa plástica vickie cerrada en la parte superior con cinta adhesiva. Si la férula, el yeso o la bota  de fibra de john se humedecen, puede secarlos con un secador para el ju. A menos que le indiquen otra cosa, puede sacarse la bota o el zapato ortopédicos para bañarse.    Puede usar acetaminofén o ibuprofeno para controlar el dolor, a menos que le hayan recetado otro medicamento para calmar el dolor. Si tiene pricilla enfermedad crónica del hígado o de los riñones, consulte a root proveedor antes de usar estos medicamentos. También hable con root proveedor si tiene pricilla úlcera de estómago o sangrado gastrointestinal.    No ponga cremas ni objetos dentro del yeso si le pica.  Visita de control  Programe pricilla visita de control con root proveedor de atención médica en pricilla semana, o según le hayan indicado. Es para asegurarse de que el hueso está recuperándose jamila. Si le colocaron pricilla férula, quizás se la cambien por un yeso o pricilla bota ortopédica en la próxima visita de control.  Si le tomaron radiografías, las evaluará un radiólogo. Le informarán de los nuevos hallazgos que puedan afectar root atención médica.   Cuándo debe buscar atención médica?  Llame a root proveedor de atención médica de inmediato si tiene cualquiera de los siguientes síntomas:    El yeso se agrieta    El yeso o la férula (de yeso) se humedecen o se ablandan    La férula o el yeso de fibra de john permanecen húmedos por más de 24 horas    Siente mal olor que viene del yeso o el líquido de la herida gaetano el yeso    Siente más dolor debajo del yeso o la férula, o siente que el yeso o la férula le quedan más ajustados    Se le hinchan los dedos, o los siente fríos o entumecidos, se brie azulados, o siente cosquilleo en camila warren    No puede  los dedos de los pies    La piel alrededor del yeso se ve enrojecida    Fiebre de 101  F (38.3  C) o más, o según le haya indicado root proveedor de atención médica  Date Last Reviewed: 2/15/2015    [id number] [employer]. [address] [place] [address], [place] [address]. Todos los derechos reservados. Esta  información no pretende sustituir la atención médica profesional. Sólo root médico puede diagnosticar y tratar un problema de batsheva.

## 2018-10-13 ENCOUNTER — HOSPITAL ENCOUNTER (EMERGENCY)
Facility: CLINIC | Age: 38
Discharge: HOME | End: 2018-10-13
Attending: EMERGENCY MEDICINE | Admitting: EMERGENCY MEDICINE
Payer: COMMERCIAL

## 2018-10-13 VITALS
OXYGEN SATURATION: 100 % | RESPIRATION RATE: 16 BRPM | SYSTOLIC BLOOD PRESSURE: 113 MMHG | HEART RATE: 68 BPM | DIASTOLIC BLOOD PRESSURE: 83 MMHG | TEMPERATURE: 97.7 F

## 2018-10-13 DIAGNOSIS — Z77.098 CHEMICAL EXPOSURE OF EYE: ICD-10-CM

## 2018-10-13 PROCEDURE — 99283 EMERGENCY DEPT VISIT LOW MDM: CPT

## 2018-10-13 RX ORDER — TOBRAMYCIN 3 MG/ML
1-2 SOLUTION/ DROPS OPHTHALMIC
Qty: 1 BOTTLE | Refills: 0 | Status: SHIPPED | OUTPATIENT
Start: 2018-10-13

## 2018-10-13 RX ORDER — TETRACAINE HYDROCHLORIDE 5 MG/ML
SOLUTION OPHTHALMIC
Status: DISCONTINUED
Start: 2018-10-13 | End: 2018-10-13 | Stop reason: HOSPADM

## 2018-10-13 RX ORDER — PURIFIED WATER 986 MG/ML
SOLUTION OPHTHALMIC
Status: DISCONTINUED
Start: 2018-10-13 | End: 2018-10-13 | Stop reason: HOSPADM

## 2018-10-13 RX ORDER — HYDROCODONE BITARTRATE AND ACETAMINOPHEN 5; 325 MG/1; MG/1
1 TABLET ORAL EVERY 6 HOURS PRN
Qty: 10 TABLET | Refills: 0 | Status: SHIPPED | OUTPATIENT
Start: 2018-10-13

## 2018-10-13 ASSESSMENT — VISUAL ACUITY
OS: 20/20
OD: 20/25

## 2018-10-13 ASSESSMENT — ENCOUNTER SYMPTOMS
EYE REDNESS: 1
EYE DISCHARGE: 0
EYE PAIN: 1

## 2018-10-13 NOTE — LETTER
October 13, 2018      To Whom It May Concern:      Teddy Fisher was seen in our Emergency Department today, 10/13/18.  I expect his condition to improve over the next 1-2 days.  He may return to work/school when improved.    Sincerely,        Dileep Roque MD

## 2018-10-13 NOTE — ED AVS SNAPSHOT
[facility] Emergency Department    [address]    [facility] [address]    Phone:  [phone]    Fax:  [phone]                                       Teddy Fisher   MRN: [id number]    Department:  [facility] Emergency Department   Date of Visit:  10/13/2018           After Visit Summary Signature Page     I have received my discharge instructions, and my questions have been answered. I have discussed any challenges I see with this plan with the nurse or doctor.    ..........................................................................................................................................  Patient/Patient Representative Signature      ..........................................................................................................................................  Patient Representative Print Name and Relationship to Patient    ..................................................               ................................................  Date                                   Time    ..........................................................................................................................................  Reviewed by Signature/Title    ...................................................              ..............................................  Date                                               Time          22EPIC Rev 08/18

## 2018-10-13 NOTE — ED TRIAGE NOTES
Patient comes in for evaluation of pain in the right eye, with some redness and swelling. Patient states that it started on Thursday, is getting worse and is swollen. Has been using over the counter eye drops to help. ABCs intact.

## 2018-10-13 NOTE — DISCHARGE INSTRUCTIONS
Exposición ocular, Química  Pricilla exposición química, o lesión, del brandon se puede producir cuando pricilla solución ácida o alcalina entra en contacto con el brandon. En cuanto se produce la exposición química, es muy importante irrigar y enjuagar el brandon con pricilla solución salina estéril. Si no cuenta con pricilla solución salina estéril de inmediato, el enjuague con agua de la canilla es pricilla alternativa aceptable. Después de enjuagar el brandon, es importante realizar un seguimiento con un proveedor de atención médica si tiene visión borrosa o siente dolor.  La exposición química puede causar desde pricilla irritación leve hasta pricilla cicatriz duradera (permanente) y pérdida de la visión. La gravedad de la lesión depende de:    Qué tipo de sustancia química causó la lesión.    La concentración de la sustancia.    Si era pricilla solución ácido o alcalina.    El tiempo que la sustancia química permaneció en root brandon.  Es común que presente cierta irritación yudith las próximas 24 horas, incluso en los casos leves. Si la exposición fue algo más maribell, mary las visitas de control según le hayan indicado.  La presión dentro del brandon puede aumentar yudith horas o días después de que el brandon tuvo pricilla lesión por contacto con pricilla sustancia química. Eso requiere tratamiento inmediato. Observe si se presentan los síntomas que se describen más abajo.  Cuidados en root casa    Puede aplicar pricilla compresa fría sobre el brandon yudith 20 minutos cada vez, para aliviar el dolor. Para armar pricilla compresa fría, coloque cubos de hielo en pricilla bolsa plástica que tenga cierre en la parte de arriba y envuélvala con pricilla toalla o un paño dacosta y limpio.     Es posible que le receten gotas para los ojos a fin de aliviar la irritación, la inflamación y el riesgo de infección. Si no le recetaron gotas, puede usar gotas descongestionantes para los ojos que vickie de venta sin receta para aliviar la irritación o el enrojecimiento.    Puede usar acetaminofén o ibuprofeno para controlar  el dolor, a menos que le hayan recetado otro medicamento. Si tiene pricilla enfermedad hepática o renal crónica, o ha tenido alguna vez pricilla úlcera estomacal o sangrado gastrointestinal, consulte con root médico antes de prieto estos medicamentos.    Si le colocaron pricilla venda en el brandon:  ? Puede colocar la compresa de hielo directamente sobre la venda.  ? Si le dieron pricilla wilberto para que regrese a que le quiten la venda y vuelvan a examinarle el brandon, no falte. Pricilla venda colocada sobre el brandon no debe dejarse yudith más de 48 horas, a menos que así se lo indique el proveedor de atención médica.  ? No conduzca un vehículo automotor ni use máquinas mientras tenga puesta la venda en el brandon. Es difícil calcular las distancias con un solo brandon.  Visitas de control  Programe pricilla vista de control con root proveedor de atención médica, o según se le haya indicado.  Cuándo debe buscar atención médica  Llame de inmediato a root proveedor de atención médica  si algo de lo siguiente ocurre:    Hinchazón del párpado que va en aumento.    El dolor que siente en el brandon va en aumento.    Mayor enrojecimiento o supuración de líquido del brandon.    No recupera la visión normal dentro de las próximas 24 a 48 horas.    Sigue sintiendo molestias en el brandon después de transcurridas 48 horas.  Date Last Reviewed: 11/16/2017 2000-2017 [employer]. [address] [place] [address], [place] [address]. Todos los derechos reservados. Esta información no pretende sustituir la atención médica profesional. Sólo root médico puede diagnosticar y tratar un problema de batsheva.

## 2018-10-13 NOTE — ED PROVIDER NOTES
History     Chief Complaint:  Eye Pain    HPI   A phone  was used obtain the below history as well as to explain diagnosis, plan of treatment, reasons to return to the emergency department and appropriate follow up.    Teddy Fisher is an otherwise healthy 38 year old male who presents to the ED for evaluation of eye pain. The patient reports that he developed a burning eye pain 2 days ago in the afternoon, greater in the right than left. The pain has progressively worsened over the past few days, and his right eye is additionally now swollen and red. He has been using OTC eye drops, without relief, and thus he presented to the ED for evaluation. 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 However, he notes that he has not had any known direct contact with these chemicals. He denies any other symptoms or concerns here today. He notes that he had a similar problem in the past, and he was seen by opthalmology at that time and had a negative workup.    Allergies:  NKDA    Medications:    The patient is currently on no regular medications.    Past Medical History:    History of blood transfusion    Past Surgical History:    Shoulder arthroscopy  Cosmetic surgery    Family History:    No past pertinent family history.    Social History:  Marital Status:   [2]  Negative for tobacco use.  Negative for alcohol use.     Review of Systems   Eyes: Positive for pain and redness. Negative for discharge.   All other systems reviewed and are negative.    Physical Exam     Patient Vitals for the past 24 hrs:   BP Temp Temp src Pulse Resp SpO2   10/13/18 0723 - 97.7  F (36.5  C) Temporal - - -   10/13/18 0720 126/69 - - 68 16 99 %   Visual Acuity Right: 20/25  Visual Acuity Left: 20/20    Physical Exam  Constitutional:  Appears well-developed and well-nourished. Alert. Conversant with his son in  Bengali and through  line. Non toxic.       HENT:   Head: Atraumatic.   Nose: Nose unremarkable   Mouth/Throat: Oropharynx is clear and moist.   Eyes: Conjunctivae normal. EOM are grossly normal. Pupils are equal, round, and reactive to light. No scleral icterus. Slit Lamp Exam:  Visual acuity (R): 20/25, (L): 20/20  Lids: No foreign body noted in detailed exam upper and lower lids/margins  PERRLA, EOMI. No exophthalmos or enophthalmos.  Anterior Chamber: No cells or flare, No hyphema. No hypopyon.   Cornea: No foreign body. Fluorescein staining: Reveals 3 or 4 punctate areas of fluorescein uptake on the right cornea on the lateral side and 2 or 3 tiny punctate areas of fluorescein uptake on the left anterior cornea more on the medial aspect.  Neck: Normal range of motion. No tracheal deviation present.   Cardiovascular: Normal rate, regular rhythm.  Pulmonary/Chest: Effort normal. No stridor. No respiratory distress.  Musculoskeletal: Unremarkable. No other abnormality noted.   Neurological: Alert and oriented to person, place, and time. Normal strength. CN II-VII intact. No sensory deficit. GCS eye subscore is 4. GCS verbal subscore is 5. GCS motor subscore is 6. Normal coordination . Intact distal sensory and motor function.  Skin: Skin is warm and dry. No rash noted. No pallor. Normal capillary refill.  Psychiatric:  normal mood and affect.    Emergency Department Course   Interventions:  Tetracaine 0.5% opthalmic solution  Fluorescein 0.6 mg opthalmic strip  Eye stream opthalmic solution    Emergency Department Course:  Nursing notes and vitals reviewed. (6037) I performed an exam of the patient as documented above.     Medicine administered as documented above.    (7432) I rechecked the patient and discussed the results of his workup thus far. I performed a slit lamp exam, as documented above.    Findings and plan explained to the Patient. Patient discharged home with instructions regarding  supportive care, medications, and reasons to return. The importance of close follow-up was reviewed. The patient was prescribed Norco and Tobrex.    I personally answered all related questions prior to discharge.    Impression & Plan      Medical Decision Making:  Teddy Fisher is a 38 year old male who is generally healthy, presenting with bilateral eye pain, right more so than left, that began Thursday afternoon after work.  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  He has had eye pain, itching, and redness of the sclera ever since then he came to the ER this morning on Saturday to get his eyes checked.  Clinical exam does show a couple of small punctate areas of floor seen uptake on both of his corneae, that could be consistent with keratitis or a chemical splash/burn.  Given the time since the injury, 2 days ago, I do not think that he needs irrigation or Mendel lens this morning.  Visual acuity is normal.  No evidence for any corneal abrasion, or foreign body.  No other direct trauma to the eye.  No evidence for globe rupture.  No evidence for any burns or abnormalities of the lids.  No evidence for corneal ulcer.  No open globe.  He is already had evaluation by ophthalmology of told him that there is no internal derangement of his eye, unclear exactly what he means by this but it sounds like he has been checked for ophthalmology and told he does not have iritis or glaucoma.  He does have a small incidental pterygium on the left eye, at the 9 o'clock position of the iris.  Discussed elective outpatient follow-up with ophthalmology for that.     For the keratitis that is present we will treat him supportively with antibiotic drops to prevent infection, pain control, I rest.  He is given a note to be off work so he can close his eyes today.  I recommended close  outpatient follow-up with ophthalmology unless completely healed within 1-2 days.    Diagnosis:    ICD-10-CM    1. Chemical exposure of eye Z77.098      Disposition:  discharged to home    Discharge Medications:  New Prescriptions    HYDROCODONE-ACETAMINOPHEN (NORCO) 5-325 MG PER TABLET    Take 1 tablet by mouth every 6 hours as needed for severe pain    TOBRAMYCIN (TOBREX) 0.3 % OPHTHALMIC SOLUTION    Place 1-2 drops into both eyes every 2 hours     Scribe Disclosure:  IAdelaida, am serving as a scribe on 10/13/2018 at 8:25 AM to personally document services performed by Dileep Roque, * based on my observations and the provider's statements to me.     Adelaida Ibrahim  10/13/2018   [facility] EMERGENCY DEPARTMENT       Dileep Roque MD  10/13/18 3007

## 2020-02-04 ENCOUNTER — HOSPITAL ENCOUNTER (EMERGENCY)
Facility: CLINIC | Age: 40
Discharge: HOME | End: 2020-02-04
Attending: PHYSICIAN ASSISTANT | Admitting: PHYSICIAN ASSISTANT
Payer: COMMERCIAL

## 2020-02-04 ENCOUNTER — APPOINTMENT (OUTPATIENT)
Dept: GENERAL RADIOLOGY | Facility: CLINIC | Age: 40
End: 2020-02-04
Attending: PHYSICIAN ASSISTANT
Payer: COMMERCIAL

## 2020-02-04 VITALS
DIASTOLIC BLOOD PRESSURE: 83 MMHG | RESPIRATION RATE: 16 BRPM | SYSTOLIC BLOOD PRESSURE: 121 MMHG | OXYGEN SATURATION: 100 % | TEMPERATURE: 98 F | HEART RATE: 59 BPM

## 2020-02-04 DIAGNOSIS — M54.6 LEFT-SIDED THORACIC BACK PAIN: ICD-10-CM

## 2020-02-04 LAB
ANION GAP SERPL CALCULATED.3IONS-SCNC: 6 MMOL/L (ref 3–14)
BASOPHILS # BLD AUTO: 0 10E9/L (ref 0–0.2)
BASOPHILS NFR BLD AUTO: 0.5 %
BUN SERPL-MCNC: 13 MG/DL (ref 7–30)
CALCIUM SERPL-MCNC: 8.1 MG/DL (ref 8.5–10.1)
CHLORIDE SERPL-SCNC: 112 MMOL/L (ref 94–109)
CO2 SERPL-SCNC: 23 MMOL/L (ref 20–32)
CREAT SERPL-MCNC: 0.8 MG/DL (ref 0.66–1.25)
DIFFERENTIAL METHOD BLD: ABNORMAL
EOSINOPHIL # BLD AUTO: 0.2 10E9/L (ref 0–0.7)
EOSINOPHIL NFR BLD AUTO: 2.4 %
ERYTHROCYTE [DISTWIDTH] IN BLOOD BY AUTOMATED COUNT: 12.4 % (ref 10–15)
GFR SERPL CREATININE-BSD FRML MDRD: >90 ML/MIN/{1.73_M2}
GLUCOSE SERPL-MCNC: 96 MG/DL (ref 70–99)
HCT VFR BLD AUTO: 37.3 % (ref 40–53)
HGB BLD-MCNC: 12.9 G/DL (ref 13.3–17.7)
IMM GRANULOCYTES # BLD: 0 10E9/L (ref 0–0.4)
IMM GRANULOCYTES NFR BLD: 0.3 %
LYMPHOCYTES # BLD AUTO: 2.2 10E9/L (ref 0.8–5.3)
LYMPHOCYTES NFR BLD AUTO: 30.3 %
MCH RBC QN AUTO: 30.4 PG (ref 26.5–33)
MCHC RBC AUTO-ENTMCNC: 34.6 G/DL (ref 31.5–36.5)
MCV RBC AUTO: 88 FL (ref 78–100)
MONOCYTES # BLD AUTO: 0.6 10E9/L (ref 0–1.3)
MONOCYTES NFR BLD AUTO: 8.7 %
NEUTROPHILS # BLD AUTO: 4.3 10E9/L (ref 1.6–8.3)
NEUTROPHILS NFR BLD AUTO: 57.8 %
NRBC # BLD AUTO: 0 10*3/UL
NRBC BLD AUTO-RTO: 0 /100
PLATELET # BLD AUTO: 185 10E9/L (ref 150–450)
POTASSIUM SERPL-SCNC: 3.5 MMOL/L (ref 3.4–5.3)
RBC # BLD AUTO: 4.25 10E12/L (ref 4.4–5.9)
SODIUM SERPL-SCNC: 141 MMOL/L (ref 133–144)
TROPONIN I SERPL-MCNC: <0.015 UG/L (ref 0–0.04)
WBC # BLD AUTO: 7.4 10E9/L (ref 4–11)

## 2020-02-04 PROCEDURE — 93005 ELECTROCARDIOGRAM TRACING: CPT

## 2020-02-04 PROCEDURE — 80048 BASIC METABOLIC PNL TOTAL CA: CPT | Performed by: PHYSICIAN ASSISTANT

## 2020-02-04 PROCEDURE — 84484 ASSAY OF TROPONIN QUANT: CPT | Performed by: PHYSICIAN ASSISTANT

## 2020-02-04 PROCEDURE — 85025 COMPLETE CBC W/AUTO DIFF WBC: CPT | Performed by: PHYSICIAN ASSISTANT

## 2020-02-04 PROCEDURE — 71046 X-RAY EXAM CHEST 2 VIEWS: CPT

## 2020-02-04 PROCEDURE — 99285 EMERGENCY DEPT VISIT HI MDM: CPT | Mod: 25

## 2020-02-04 RX ORDER — METHOCARBAMOL 750 MG/1
750-1500 TABLET, FILM COATED ORAL 3 TIMES DAILY PRN
Qty: 30 TABLET | Refills: 0 | Status: SHIPPED | OUTPATIENT
Start: 2020-02-04 | End: 2020-02-09

## 2020-02-04 RX ORDER — IBUPROFEN 600 MG/1
600 TABLET, FILM COATED ORAL EVERY 8 HOURS PRN
Qty: 30 TABLET | Refills: 0 | Status: SHIPPED | OUTPATIENT
Start: 2020-02-04

## 2020-02-04 ASSESSMENT — ENCOUNTER SYMPTOMS
FEVER: 0
NAUSEA: 1
SHORTNESS OF BREATH: 1
DYSURIA: 0
CHILLS: 0
BACK PAIN: 1
VOMITING: 1

## 2020-02-04 NOTE — ED AVS SNAPSHOT
[facility] Emergency Department  [address]  [facility] [address]  Phone:  [phone]  Fax:  [phone]                                    Teddy Fisher   MRN: [id number]    Department:  [facility] Emergency Department   Date of Visit:  2/4/2020           After Visit Summary Signature Page    I have received my discharge instructions, and my questions have been answered. I have discussed any challenges I see with this plan with the nurse or doctor.    ..........................................................................................................................................  Patient/Patient Representative Signature      ..........................................................................................................................................  Patient Representative Print Name and Relationship to Patient    ..................................................               ................................................  Date                                   Time    ..........................................................................................................................................  Reviewed by Signature/Title    ...................................................              ..............................................  Date                                               Time          22EPIC Rev 08/18

## 2020-02-05 LAB — INTERPRETATION ECG - MUSE: NORMAL

## 2020-02-05 NOTE — ED PROVIDER NOTES
"  History     Chief Complaint:  Back Pain    The history is provided by the patient. The history is limited by a language barrier. A  was used.      Teddy Fisher is a 39 year old male who presents with left upper back and shoulder pain. The patient states that the pain started two weeks ago and has been persistent. He describes the pain as \"throbbing\" and states it does not allow him to move and is painful when he moves his shoulder. He denies shortness of breath, but notes occasional nausea. No vomiting, leg swelling, or diaphoresis.  He states that the pain is present when he is sitting still and when he is moving but worse with twisting or positional change. He has not been able to find anything that relieves the pain. He denies any pain in the front of chest, blood in urine,  fever, chills, or dysuria. He states he has had no recent falls or injuries. He has no history of lung or heart issues or cancer. This is the first time the patient has experienced this pain.  No bowel or bladder incontinence.  He is not on blood thinners.  No leg pain or swelling.  No recent surgeries or hospitalizations.  No prior DVT or PE.  No cough or hemoptysis.    Allergies:  No known drug allergies    Medications:    No known drug allergies    Past Medical History:    History of blood transfusion    Past Surgical History:    Combined arthroscopy shoulder, open distal clavicle resection: Left shoulder examination under anesthesia with left shoulder arthroscopy, arthroscopic subacromial decompression, mini open distal clavicle excision    Facial reconstruction after surgery     Family History:    History reviewed. No pertinent family history.     Social History:  Smoking status: No  Alcohol use: No  Drug use: No  The patient presents to the emergency department with children  PCP: [facility]  Marital Status:   [2]    Review of Systems   Constitutional: Negative for chills " and fever.   Respiratory: Positive for shortness of breath.    Gastrointestinal: Positive for nausea and vomiting.   Genitourinary: Negative for dysuria.   Musculoskeletal: Positive for back pain.   All other systems reviewed and are negative.    Physical Exam     Patient Vitals for the past 24 hrs:   BP Temp Pulse Resp SpO2   02/04/20 2046 -- -- -- 16 --   02/04/20 1851 121/83 98  F (36.7  C) 59 18 100 %     Physical Exam    General: Alert and cooperative with exam. Resting comfortably on gurney  Head:  Scalp is NC/AT  Eyes:  No scleral icterus, PERRL  ENT:  The external nose and ears are normal.   Neck:  Normal range of motion without rigidity.  CV:  Regular rate and rhythm    No pathologic murmur, rubs, or gallops.  Resp:  Breath sounds are clear bilaterally.  No crackles, wheezes, rhonchi, stridor.    Non-labored, no retractions or accessory muscle use  GI:  Abdomen is soft, no distension, no tenderness, no masses. No peritoneal signs.  Bowel sounds present in all quadrants  :  No suprapubic or flank tenderness  MS:  No lower extremity edema or asymmetric calf swelling.    No midline cervical, thoracic, or lumbar tenderness  Skin:  Warm and dry, No rash or lesions noted.  Neuro: Oriented. No gross motor deficits.    Strength and sensation grossly intact in all 4 extremities.  Cranial nerves 2-12 intact. GCS: 15. Normal finger to nose and heel to shin testing.  Gait normal  Psych: Awake. Alert. Normal affect. Appropriate interactions.    Emergency Department Course   ECG (19:20:33):  Rate 61 bpm. SC interval 128. QRS duration 90. QT/QTc 442/444. P-R-T axes 47 10 13. Normal sinus rhythm. Normal ECG. Interpreted at 1923 by Peter Mcfarlane Dixson, Kylie S, MD.    Imaging:  Radiology findings were communicated with the patient who voiced understanding of the findings.    XR Chest 2 Views  IMPRESSION: Lungs clear. Heart and hilar structures normal  As read by Radiology.    Laboratory:  Laboratory findings  were communicated with the patient who voiced understanding of the findings.    CBC: HGB 12.9 (L) o/w WNL (WBC 7.4, )    BMP: Chloride: 112 (H), Calcium: 8.1 (L) o/w WNL (Creatinine 0.80)    Troponin I (): <0.015     Emergency Department Course:  Past medical records, nursing notes, and vitals reviewed.  : I performed an exam of the patient and obtained history, as documented above.     EKG was obtained and reviewed in the emergency department.    IV inserted and blood drawn.    The patient was sent for a Chest XR while in the emergency department, results above.     : I rechecked the patient. I reviewed the results with the Patient and answered all related questions prior to discharge.     Findings and plan explained to the Patient. Patient discharged home with instructions regarding supportive care, medications, and reasons to return. The importance of close follow-up was reviewed. The patient was prescribed Advil and Robaxin.  Impression & Plan   Medical Decision Makin-year-old male who presents with left upper thoracic back pain.  Patient history and records reviewed.  A broad differential was considered including but not limited to: ACS, PE, dissection, pneumonia, pneumothorax, pyelonephritis, kidney stone, shingles, musculoskeletal pain, pancreatitis, fracture, spinal abscess, malignancy, cauda equina etc.  The work-up here in the emergency department has been reassuring.  EKG is normal without evidence of ischemia or arrhythmia.  Troponin is undetectable despite constant symptoms greater than 6 hours.  The patient is a heart score of 0, and I think ACS is very unlikely at this time.  He is PERC negative and I doubt pulmonary embolism.  Chest x-ray demonstrates no evidence of pneumonia, pneumothorax, or mediastinal widening and there are no high risk factors to raise concern for aortic dissection.  He has a benign abdominal examination with no tenderness to palpation to suggest  referred pain.  He does not have any urinary symptoms or fever to suggest pyelonephritis.  He has no midline pain or tenderness, and I feel more serious spinal etiologies such as osteomyelitis, spinal abscess, cauda equina syndrome, spinal hematoma, fracture etc. very unlikely.  No history of trauma.  He has no evidence of infectious process and has normal range of motion of the shoulder.    I feel his symptoms are most consistent with musculoskeletal cause.  Plan will be NSAIDs, muscle relaxers, follow-up with primary care provider in 2 to 3 days.  Return precautions were given for any new or worsening or changing symptoms.    Diagnosis:    ICD-10-CM    1. Left-sided thoracic back pain M54.6      Disposition:  Discharged to home.    Discharge Medications:  Discharge Medication List as of 2/4/2020  8:39 PM      START taking these medications    Details   ibuprofen (ADVIL/MOTRIN) 600 MG tablet Take 1 tablet (600 mg) by mouth every 8 hours as needed for moderate pain, Disp-30 tablet, R-0, Local Print      methocarbamol (ROBAXIN) 750 MG tablet Take 1-2 tablets (750-1,500 mg) by mouth 3 times daily as needed for muscle spasms, Disp-30 tablet, R-0, Local Print         Elena Connors  2/4/2020   [facility] EMERGENCY DEPARTMENT  Scribe Disclosure:  I, Elena Connors, am serving as a scribe at 7:01 PM on 2/4/2020 to document services personally performed by Peter Mcfarlane PA based on my observations and the provider's statements to me.        Peter Mcfarlane PA-C  02/04/20 [id number]

## 2020-02-05 NOTE — ED TRIAGE NOTES
Patient presents with complaints of upper right sided back pain for the past two weeks. No known injury. ABC intact without need for intervention at this time.

## 2020-07-02 ENCOUNTER — TRANSFERRED RECORDS (OUTPATIENT)
Dept: HEALTH INFORMATION MANAGEMENT | Facility: CLINIC | Age: 40
End: 2020-07-02

## 2020-07-02 ENCOUNTER — APPOINTMENT (OUTPATIENT)
Dept: GENERAL RADIOLOGY | Facility: CLINIC | Age: 40
End: 2020-07-02
Attending: EMERGENCY MEDICINE
Payer: COMMERCIAL

## 2020-07-02 ENCOUNTER — HOSPITAL ENCOUNTER (EMERGENCY)
Facility: CLINIC | Age: 40
Discharge: HOME | End: 2020-07-02
Attending: EMERGENCY MEDICINE | Admitting: EMERGENCY MEDICINE
Payer: COMMERCIAL

## 2020-07-02 VITALS
HEART RATE: 64 BPM | DIASTOLIC BLOOD PRESSURE: 79 MMHG | OXYGEN SATURATION: 99 % | WEIGHT: 197.53 LBS | TEMPERATURE: 99.1 F | SYSTOLIC BLOOD PRESSURE: 129 MMHG | BODY MASS INDEX: 30.03 KG/M2 | RESPIRATION RATE: 11 BRPM

## 2020-07-02 DIAGNOSIS — R07.9 CHEST PAIN, UNSPECIFIED TYPE: ICD-10-CM

## 2020-07-02 DIAGNOSIS — K21.00 GASTROESOPHAGEAL REFLUX DISEASE WITH ESOPHAGITIS: ICD-10-CM

## 2020-07-02 LAB
ALBUMIN SERPL-MCNC: 3.9 G/DL (ref 3.4–5)
ALP SERPL-CCNC: 92 U/L (ref 40–150)
ALT SERPL W P-5'-P-CCNC: 60 U/L (ref 0–70)
ANION GAP SERPL CALCULATED.3IONS-SCNC: 6 MMOL/L (ref 3–14)
AST SERPL W P-5'-P-CCNC: 43 U/L (ref 0–45)
BASOPHILS # BLD AUTO: 0 10E9/L (ref 0–0.2)
BASOPHILS NFR BLD AUTO: 0.3 %
BILIRUB DIRECT SERPL-MCNC: <0.1 MG/DL (ref 0–0.2)
BILIRUB SERPL-MCNC: 0.4 MG/DL (ref 0.2–1.3)
BUN SERPL-MCNC: 10 MG/DL (ref 7–30)
CALCIUM SERPL-MCNC: 8.2 MG/DL (ref 8.5–10.1)
CHLORIDE SERPL-SCNC: 110 MMOL/L (ref 94–109)
CO2 SERPL-SCNC: 25 MMOL/L (ref 20–32)
CREAT SERPL-MCNC: 0.93 MG/DL (ref 0.66–1.25)
DIFFERENTIAL METHOD BLD: ABNORMAL
EOSINOPHIL # BLD AUTO: 0.1 10E9/L (ref 0–0.7)
EOSINOPHIL NFR BLD AUTO: 1.2 %
ERYTHROCYTE [DISTWIDTH] IN BLOOD BY AUTOMATED COUNT: 12.6 % (ref 10–15)
GFR SERPL CREATININE-BSD FRML MDRD: >90 ML/MIN/{1.73_M2}
GLUCOSE SERPL-MCNC: 102 MG/DL (ref 70–99)
HCT VFR BLD AUTO: 38.9 % (ref 40–53)
HGB BLD-MCNC: 13.4 G/DL (ref 13.3–17.7)
IMM GRANULOCYTES # BLD: 0 10E9/L (ref 0–0.4)
IMM GRANULOCYTES NFR BLD: 0.1 %
LIPASE SERPL-CCNC: 115 U/L (ref 73–393)
LYMPHOCYTES # BLD AUTO: 1.6 10E9/L (ref 0.8–5.3)
LYMPHOCYTES NFR BLD AUTO: 23.4 %
MCH RBC QN AUTO: 30.3 PG (ref 26.5–33)
MCHC RBC AUTO-ENTMCNC: 34.4 G/DL (ref 31.5–36.5)
MCV RBC AUTO: 88 FL (ref 78–100)
MONOCYTES # BLD AUTO: 0.4 10E9/L (ref 0–1.3)
MONOCYTES NFR BLD AUTO: 5.6 %
NEUTROPHILS # BLD AUTO: 4.8 10E9/L (ref 1.6–8.3)
NEUTROPHILS NFR BLD AUTO: 69.4 %
NRBC # BLD AUTO: 0 10*3/UL
NRBC BLD AUTO-RTO: 0 /100
PLATELET # BLD AUTO: 196 10E9/L (ref 150–450)
POTASSIUM SERPL-SCNC: 3.7 MMOL/L (ref 3.4–5.3)
PROT SERPL-MCNC: 7.4 G/DL (ref 6.8–8.8)
RBC # BLD AUTO: 4.42 10E12/L (ref 4.4–5.9)
SODIUM SERPL-SCNC: 141 MMOL/L (ref 133–144)
TROPONIN I SERPL-MCNC: <0.015 UG/L (ref 0–0.04)
WBC # BLD AUTO: 6.8 10E9/L (ref 4–11)

## 2020-07-02 PROCEDURE — 99285 EMERGENCY DEPT VISIT HI MDM: CPT | Mod: 25

## 2020-07-02 PROCEDURE — 25000132 ZZH RX MED GY IP 250 OP 250 PS 637: Performed by: EMERGENCY MEDICINE

## 2020-07-02 PROCEDURE — 85025 COMPLETE CBC W/AUTO DIFF WBC: CPT | Performed by: EMERGENCY MEDICINE

## 2020-07-02 PROCEDURE — 25000125 ZZHC RX 250: Performed by: EMERGENCY MEDICINE

## 2020-07-02 PROCEDURE — 71046 X-RAY EXAM CHEST 2 VIEWS: CPT

## 2020-07-02 PROCEDURE — 80076 HEPATIC FUNCTION PANEL: CPT | Performed by: EMERGENCY MEDICINE

## 2020-07-02 PROCEDURE — 83690 ASSAY OF LIPASE: CPT | Performed by: EMERGENCY MEDICINE

## 2020-07-02 PROCEDURE — 93005 ELECTROCARDIOGRAM TRACING: CPT

## 2020-07-02 PROCEDURE — 84484 ASSAY OF TROPONIN QUANT: CPT | Performed by: EMERGENCY MEDICINE

## 2020-07-02 PROCEDURE — 80048 BASIC METABOLIC PNL TOTAL CA: CPT | Performed by: EMERGENCY MEDICINE

## 2020-07-02 RX ADMIN — LIDOCAINE HYDROCHLORIDE 30 ML: 20 SOLUTION ORAL; TOPICAL at 16:47

## 2020-07-02 ASSESSMENT — ENCOUNTER SYMPTOMS
NAUSEA: 1
SORE THROAT: 1
FEVER: 0

## 2020-07-02 NOTE — ED AVS SNAPSHOT
[facility] Emergency Department  [address]  [facility] [address]  Phone:  [phone]  Fax:  [phone]                                    Teddy Fisher   MRN: [id number]    Department:  [facility] Emergency Department   Date of Visit:  7/2/2020           After Visit Summary Signature Page    I have received my discharge instructions, and my questions have been answered. I have discussed any challenges I see with this plan with the nurse or doctor.    ..........................................................................................................................................  Patient/Patient Representative Signature      ..........................................................................................................................................  Patient Representative Print Name and Relationship to Patient    ..................................................               ................................................  Date                                   Time    ..........................................................................................................................................  Reviewed by Signature/Title    ...................................................              ..............................................  Date                                               Time          22EPIC Rev 08/18

## 2020-07-02 NOTE — ED TRIAGE NOTES
"Patient sent over from urgent care for \"throat pain, stomach pain, nausea, headache and chest pain\".   "

## 2020-07-02 NOTE — ED PROVIDER NOTES
History     Chief Complaint:    Chest Pain      HPI   Teddy P Roopa Fisher is a 40 year old male who presents with chest pain that started last night.  It has been remittent since then.  He reports it starts with food and worse with laying down.  If he sits up it does help.  He reports nausea when he eats.  He has had this in the past but it has been quite some time.  He saw his clinic recently and was prescribed omeprazole which she started taking yesterday.  He took it this morning as well as MiraLAX.  He denies any fevers.  He also describes throat pain that is more so when he eats but also a little bit when he swallows.  He has already had COVID testing and strep testing which was negative.  He denies any alcohol use.  He denies any smoking history.  He denies any history of family heart disease.  He denies any history of hypertension or diabetes.    Allergies:  No Known Drug Allergies    Medications:    Prilosec  Miralax    Past Medical History:    Blood transfusion  Intrinsic eczema  Shoulder impingement syndrome    Past Surgical History:    Shoulder arthroscopy-Clavicle resection  Facial reconstruction    Family History:    Mother: Cataract, hyperlipidemia  Father: Cataract, hypertension, thyroid diorder  Sister: Kidney disorder    Social History:  Smoking Status: Never Smoker  Smokeless Tobacco: Never Used  Alcohol Use: Negative  Drug Use: Negative  PCP: [facility], [facility]    Marital Status:       Review of Systems   Constitutional: Negative for fever.   HENT: Positive for sore throat.    Cardiovascular: Positive for chest pain.   Gastrointestinal: Positive for nausea.   All other systems reviewed and are negative.      Physical Exam     Patient Vitals for the past 24 hrs:   BP Temp Temp src Pulse Heart Rate Resp SpO2 Weight   07/02/20 1414 139/77 99.1  F (37.3  C) Oral 75 75 18 99 % 89.6 kg (197 lb 8.5 oz)       Physical Exam  General: Patient is alert and interactive when I  enter the room  Head:  The scalp, face, and head appear normal  Eyes:  Conjunctivae are normal  ENT:    The nose is normal    Pinnae are normal    External acoustic canals are normal    Posterior pharynx clear, no PTA   Neck:  Trachea midline  CV:  Pulses are normal, RRR  Resp:  No respiratory distress, CTAB   Abdomen:      Soft, non-tender, non-distended  Musc:  Normal muscular tone    No major joint effusions    No asymmetric leg swelling    Good capillary refill noted  Skin:  No rash or lesions noted  Neuro:  Speech is normal and fluent. Face is symmetric.     Moving all extremities well.   Psych: Awake. Alert.  Normal affect.  Appropriate interactions.    Emergency Department Course     ECG:  Indication: Chest pain  Time: 1438  Vent. Rate 73 bpm. ND interval 128. QRS duration 96. QT/QTc 420/462. P-R-T axis 38 25 13.  Sinus rhythm. Normal ECG. No significant change compared to EKG dated 2/4/2020. Read time: 1448    Imaging:  Radiology findings were communicated with the patient who voiced understanding of the findings.    XR Chest, G/E 2 views:   No acute cardiopulmonary disease. As per radiology.    Laboratory:  Laboratory findings were communicated with the patient who voiced understanding of the findings.    BMP: Glucose 102 (H), Chloride: 110 (H), o/w WNL (Creatinine: 0.93)    CBC: WBC: 6.8, HGB: 13.4, PLT: 196    Hepatic Panel: All WNL    Lipase: 115    1450 Troponin: <0.015    Interventions:  1647 GI Cocktail - Maalox 15 mL, Viscous Lidocaine 15 mL, 30 mL suspension PO    Emergency Department Course:  Past medical records, nursing notes, and vitals reviewed.    1559 I performed an exam of the patient as documented above.     EKG obtained in the ED, see results above.     IV was inserted and blood was drawn for laboratory testing, results above.    The patient was sent for a Chest X-ray while in the emergency department, results above.     Findings and plan explained to the Patient. Patient discharged home  with instructions regarding supportive care, medications, and reasons to return. The importance of close follow-up was reviewed.     I personally reviewed the laboratory and imaging results with the Patient and answered all related questions prior to discharge.     Impression & Plan   Medical Decision Making:  Manuel Barbeho Calle is a 39 yo M who presents with throat pain, abdominal pain and chest pain. Vitals unremarkable. EKG showed NSR. His symptoms sound very much like GERD. Blood work up was unremarkable including troponin. Patient felt improved after GI cocktail. Patient felt improved. Recommend follow-up with PCP and continue taking omeprazole.     Diagnosis:    ICD-10-CM    1. Chest pain, unspecified type  R07.9 Hepatic panel     Hepatic panel     Lipase     Lipase     CANCELED: Hepatic panel     CANCELED: Lipase   2. Gastroesophageal reflux disease with esophagitis  K21.0        Disposition:  Discharged to home.    Scribe Disclosure:  I, Zaira Barrow, am serving as a scribe on 7/2/2020 at 4:23 PM to personally document services performed by Elma Wells MD based on my observations and the provider's statements to me.     Scribe Disclosure:  I, Barrett Bowen, am serving as a scribe on 7/2/2020 at 5:32 PM to personally document services performed by Elma Wells MD based on my observations and the provider's statements to me.     Scribe Disclosure:  I, Neel Roldan, am serving as a scribe at 3:48 PM on 7/2/2020 to document services personally performed by Arthur Blackmon MD based on my observations and the provider's statements to me.        Elma Wells MD  07/06/20 2066

## 2020-07-04 LAB — INTERPRETATION ECG - MUSE: NORMAL

## 2020-07-08 ENCOUNTER — HOSPITAL ENCOUNTER (EMERGENCY)
Facility: CLINIC | Age: 40
Discharge: HOME | End: 2020-07-08
Attending: EMERGENCY MEDICINE | Admitting: EMERGENCY MEDICINE
Payer: COMMERCIAL

## 2020-07-08 ENCOUNTER — APPOINTMENT (OUTPATIENT)
Dept: GENERAL RADIOLOGY | Facility: CLINIC | Age: 40
End: 2020-07-08
Attending: EMERGENCY MEDICINE
Payer: COMMERCIAL

## 2020-07-08 VITALS
OXYGEN SATURATION: 98 % | DIASTOLIC BLOOD PRESSURE: 89 MMHG | SYSTOLIC BLOOD PRESSURE: 131 MMHG | TEMPERATURE: 99 F | HEART RATE: 87 BPM | RESPIRATION RATE: 16 BRPM

## 2020-07-08 DIAGNOSIS — J02.9 PHARYNGITIS, UNSPECIFIED ETIOLOGY: ICD-10-CM

## 2020-07-08 DIAGNOSIS — R79.89 ELEVATED TSH: ICD-10-CM

## 2020-07-08 LAB
ANION GAP SERPL CALCULATED.3IONS-SCNC: 8 MMOL/L (ref 3–14)
BASOPHILS # BLD AUTO: 0 10E9/L (ref 0–0.2)
BASOPHILS NFR BLD AUTO: 0.6 %
BUN SERPL-MCNC: 13 MG/DL (ref 7–30)
CALCIUM SERPL-MCNC: 8.3 MG/DL (ref 8.5–10.1)
CHLORIDE SERPL-SCNC: 107 MMOL/L (ref 94–109)
CO2 SERPL-SCNC: 24 MMOL/L (ref 20–32)
CREAT SERPL-MCNC: 0.84 MG/DL (ref 0.66–1.25)
DEPRECATED S PYO AG THROAT QL EIA: NEGATIVE
DIFFERENTIAL METHOD BLD: NORMAL
EOSINOPHIL # BLD AUTO: 0.1 10E9/L (ref 0–0.7)
EOSINOPHIL NFR BLD AUTO: 0.7 %
ERYTHROCYTE [DISTWIDTH] IN BLOOD BY AUTOMATED COUNT: 12.3 % (ref 10–15)
GFR SERPL CREATININE-BSD FRML MDRD: >90 ML/MIN/{1.73_M2}
GLUCOSE SERPL-MCNC: 107 MG/DL (ref 70–99)
HCT VFR BLD AUTO: 41 % (ref 40–53)
HGB BLD-MCNC: 14.4 G/DL (ref 13.3–17.7)
IMM GRANULOCYTES # BLD: 0 10E9/L (ref 0–0.4)
IMM GRANULOCYTES NFR BLD: 0.6 %
LYMPHOCYTES # BLD AUTO: 1.7 10E9/L (ref 0.8–5.3)
LYMPHOCYTES NFR BLD AUTO: 23.8 %
MCH RBC QN AUTO: 30.8 PG (ref 26.5–33)
MCHC RBC AUTO-ENTMCNC: 35.1 G/DL (ref 31.5–36.5)
MCV RBC AUTO: 88 FL (ref 78–100)
MONOCYTES # BLD AUTO: 0.5 10E9/L (ref 0–1.3)
MONOCYTES NFR BLD AUTO: 6.8 %
NEUTROPHILS # BLD AUTO: 4.7 10E9/L (ref 1.6–8.3)
NEUTROPHILS NFR BLD AUTO: 67.5 %
NRBC # BLD AUTO: 0 10*3/UL
NRBC BLD AUTO-RTO: 0 /100
PLATELET # BLD AUTO: 186 10E9/L (ref 150–450)
POTASSIUM SERPL-SCNC: 3.8 MMOL/L (ref 3.4–5.3)
RBC # BLD AUTO: 4.67 10E12/L (ref 4.4–5.9)
SODIUM SERPL-SCNC: 139 MMOL/L (ref 133–144)
SPECIMEN SOURCE: NORMAL
SPECIMEN SOURCE: NORMAL
STREP GROUP A PCR: NOT DETECTED
T4 FREE SERPL-MCNC: 0.84 NG/DL (ref 0.76–1.46)
TSH SERPL DL<=0.005 MIU/L-ACNC: 20.16 MU/L (ref 0.4–4)
WBC # BLD AUTO: 6.9 10E9/L (ref 4–11)

## 2020-07-08 PROCEDURE — 85025 COMPLETE CBC W/AUTO DIFF WBC: CPT | Performed by: EMERGENCY MEDICINE

## 2020-07-08 PROCEDURE — 84439 ASSAY OF FREE THYROXINE: CPT | Performed by: EMERGENCY MEDICINE

## 2020-07-08 PROCEDURE — 72040 X-RAY EXAM NECK SPINE 2-3 VW: CPT

## 2020-07-08 PROCEDURE — 40001204 ZZHCL STATISTIC STREP A RAPID: Performed by: EMERGENCY MEDICINE

## 2020-07-08 PROCEDURE — 25000132 ZZH RX MED GY IP 250 OP 250 PS 637: Performed by: EMERGENCY MEDICINE

## 2020-07-08 PROCEDURE — 87651 STREP A DNA AMP PROBE: CPT | Performed by: EMERGENCY MEDICINE

## 2020-07-08 PROCEDURE — 99284 EMERGENCY DEPT VISIT MOD MDM: CPT

## 2020-07-08 PROCEDURE — 84443 ASSAY THYROID STIM HORMONE: CPT | Performed by: EMERGENCY MEDICINE

## 2020-07-08 PROCEDURE — 25000125 ZZHC RX 250: Performed by: EMERGENCY MEDICINE

## 2020-07-08 PROCEDURE — 80048 BASIC METABOLIC PNL TOTAL CA: CPT | Performed by: EMERGENCY MEDICINE

## 2020-07-08 RX ORDER — LIDOCAINE 40 MG/G
CREAM TOPICAL
Status: DISCONTINUED | OUTPATIENT
Start: 2020-07-08 | End: 2020-07-08 | Stop reason: HOSPADM

## 2020-07-08 RX ADMIN — LIDOCAINE HYDROCHLORIDE 30 ML: 20 SOLUTION ORAL; TOPICAL at 09:48

## 2020-07-08 ASSESSMENT — ENCOUNTER SYMPTOMS
NUMBNESS: 1
APPETITE CHANGE: 0
NECK PAIN: 1
TROUBLE SWALLOWING: 1
RHINORRHEA: 0
SORE THROAT: 1
SHORTNESS OF BREATH: 1
UNEXPECTED WEIGHT CHANGE: 0

## 2020-07-08 NOTE — DISCHARGE INSTRUCTIONS
Continue with medication prescribed for the acid reflux.   May use salt water gargle for symptom management as needed.

## 2020-07-08 NOTE — ED TRIAGE NOTES
3 months ago was choked while playing with kids has had neck pain since.  Patient alert and oriented x3.  Airway, breathing and circulation intact.

## 2020-07-08 NOTE — ED AVS SNAPSHOT
[facility] Emergency Department  [address]  [facility] [address]  Phone:  [phone]  Fax:  [phone]                                    Teddy Fisher   MRN: [id number]    Department:  [facility] Emergency Department   Date of Visit:  7/8/2020           After Visit Summary Signature Page    I have received my discharge instructions, and my questions have been answered. I have discussed any challenges I see with this plan with the nurse or doctor.    ..........................................................................................................................................  Patient/Patient Representative Signature      ..........................................................................................................................................  Patient Representative Print Name and Relationship to Patient    ..................................................               ................................................  Date                                   Time    ..........................................................................................................................................  Reviewed by Signature/Title    ...................................................              ..............................................  Date                                               Time          22EPIC Rev 08/18

## 2020-07-08 NOTE — ED PROVIDER NOTES
History     Chief Complaint:  Throat Pain       HPI  Teddy Fisher is a 40 year old year old male who presents for evaluation of sore throat. Three months ago he was playing with his kids when they hugged him really tight around the neck and he suddenly felt something move out of place in his throat and experienced a lot of pain. He was seen for his symptoms on 06/23 and 06/29 including covid swabs which were negative. The pain has been mild since and he thought it was just a cold but then for the past three weeks or so the pain has worsened, prompting him to present to the ED. His current symptoms include sore throat, trouble swallowing, shortness of breath, tingling all over, neck pain, and numbness in the back of his neck. Of note, he works policing and washing cars. He recently started taking a daily heartburn medication. The patient denies nasal drainage or discharge, sneezing, change in weight, change in appetite, family with similar symptoms, or a history of oral sex.   He believes that he has lost some weight based on his appearance in a photo from last year. He admits that his wife has been trying different diets and so then he eats what she buys/makes.     Allergies:  No Known Drug Allergies      Medications:   Heartburn medication      Medical History:   Intrinsic eczema  Shoulder impingement syndrome  Head injury      Surgical History   Arthroscopy shoulder, open distal clavicle repair, combined  Cosmetic surgery, facial reconstruction after trauma  Skull reconstruciton      Family History:   Cataract  Hypertension  Hyperlipidemia  Thyroid disorder  Glaucoma  Kidney disorder      Social History:  Patient was not accompanied to the ED.  Smoking Status: Negative   Smokeless Tobacco: Negative   Alcohol Use: Negative   Drug Use: Negative   Primary Physician: [facility], [facility]       Review of Systems   Constitutional: Negative for appetite change and unexpected weight change.    HENT: Positive for sore throat and trouble swallowing. Negative for rhinorrhea and sneezing.    Respiratory: Positive for shortness of breath.    Musculoskeletal: Positive for neck pain.   Neurological: Positive for numbness (back of neck).   All other systems reviewed and are negative.      Physical Exam     Patient Vitals for the past 24 hrs:   BP Temp Temp src Pulse Heart Rate Resp SpO2   07/08/20 1125 131/89 -- -- 87 -- -- 99 %   07/08/20 0916 124/78 99  F (37.2  C) Oral -- 87 16 100 %          Physical Exam      Nursing note and vitals reviewed.    Constitutional: Pleasant and well groomed.          HENT:    Mouth/Throat: Oropharynx is without swelling. Oral mucosa moist. Mild erythema to posterior oropharynx, no exudate. No trismus. Speaking with a normal voice.    Eyes: Conjunctivae are normal. No scleral icterus.    Neck: Neck supple. Range of motion intact without apparent discomfort. . Thyroid-non-tender, possible slight enlargement. No deformity to anterior neck. Reports some midline posterior tenderness diffusely.   Cardiovascular: Normal rate, regular rhythm and intact distal pulses.    Pulmonary/Chest: Effort normal and breath sounds normal.   Abdominal: Soft.  No distension. There is no tenderness.   Musculoskeletal:  No edema, No calf tenderness  Neurological:Alert and answering questions appropriately. Coordination normal. Upper and lower extremity strength intact throughout. Light touch sensation intact throughout.   Skin: Skin is warm and dry.   Psychiatric: Normal mood and affect.         Emergency Department Course     Imaging:  Radiology results were communicated with the patient who voiced understanding of the findings.    XR Cervical Spine 2/3 Views  Cervical spine alignment is within normal limits. No loss  of vertebral body height or prevertebral soft tissue swelling. Mild  degenerative endplate changes without significant loss of disc height  at C5-6 and C6-7. The base of the dens is  unremarkable on the odontoid  view.     DYLAN PEDERSEN MD    Reading per radiology     Laboratory:  Laboratory findings were communicated with the patient who voiced understanding of the findings.    TSH with free T4 reflex: 20.16  T4 free: 0.84  Rapid Strep Test: Negative  Strep A Culture: Pending      CBC: WBC 6.9, HGB 14.4,   BMP: Glucose 107 (H), Calcium 8.3 (L) (Creatinine 0.84) o/w WNL     Interventions:   0948 Lidocaine 30 mL GI Cocktail PO      Emergency Department Course:    0920 Nursing notes and vitals reviewed.    0930 I performed an exam of the patient as documented above.     0955 IV was inserted and blood was drawn for laboratory testing, results above.    0959 A nares sample was obtained for laboratory testing as documented above.    1032 The patient was sent for XR while in the emergency department, results above.     1139 Findings and plan explained to the Patient. Patient discharged home with instructions regarding supportive care, medications, and reasons to return. The importance of close follow-up was reviewed. The patient was prescribed as below.    Impression & Plan     Medical Decision Making:  Teddy Fisher is a 40 year old male who presents with approximately 3 weeks of throat symptoms which have been evaluated in the past but now he is concerned about a trauma which is failed to communicate before.  He does have some mild pharyngitis on exam but no other concerning features as far as p.o. intake, trismus, voice change.  Repeat strep was negative.  He has had 2- COVID test in the past.  I did consider that his thyroid was slightly enlarged and did send thyroid studies.  He did have an elevated TSH but his free T4 is normal.  I have recommended he follow-up on this although it does not appear that he has acute concerning hypo-or hyperthyroidism at this time.  Also obtain imaging due to the history of trauma which was unremarkable without soft tissue swelling, air or bony  abnormality.  With reasonable clinical certainty ability safe for discharge home at this time.  It is possible the symptoms are related to GERD and therefore I recommended he continue with the PPI.  I will refer him to ENT if his symptoms persist and have encouraged him to follow-up with his primary care physician as well within the next week if the symptoms do not resolve.  He understands to return to the emergency department with any new or worsening symptoms.      Diagnosis:     ICD-10-CM    1. Pharyngitis, unspecified etiology  J02.9 Group A Streptococcus PCR Throat Swab   2. Elevated TSH  R79.89     normal T4        Disposition:  Discharged to home.    Discharge Medications:  New Prescriptions    No medications on file       Scribe Disclosure:  Radha CHILDRESS, am serving as a scribe at 9:31 AM on 7/8/2020 to document services personally performed by Sonia Grant MD based on my observations and the provider's statements to me.      Sonia Grant MD  07/08/20 [id number]

## 2020-12-27 ENCOUNTER — HEALTH MAINTENANCE LETTER (OUTPATIENT)
Age: 40
End: 2020-12-27

## 2021-10-09 ENCOUNTER — HEALTH MAINTENANCE LETTER (OUTPATIENT)
Age: 41
End: 2021-10-09

## 2022-01-29 ENCOUNTER — HEALTH MAINTENANCE LETTER (OUTPATIENT)
Age: 42
End: 2022-01-29

## 2022-03-29 ENCOUNTER — APPOINTMENT (OUTPATIENT)
Dept: CT IMAGING | Facility: CLINIC | Age: 42
End: 2022-03-29
Attending: EMERGENCY MEDICINE
Payer: COMMERCIAL

## 2022-03-29 ENCOUNTER — HOSPITAL ENCOUNTER (EMERGENCY)
Facility: CLINIC | Age: 42
Discharge: HOME | End: 2022-03-29
Attending: EMERGENCY MEDICINE | Admitting: EMERGENCY MEDICINE
Payer: COMMERCIAL

## 2022-03-29 VITALS
SYSTOLIC BLOOD PRESSURE: 131 MMHG | TEMPERATURE: 98.7 F | RESPIRATION RATE: 16 BRPM | HEART RATE: 60 BPM | DIASTOLIC BLOOD PRESSURE: 81 MMHG | OXYGEN SATURATION: 99 %

## 2022-03-29 DIAGNOSIS — R51.9 NONINTRACTABLE EPISODIC HEADACHE, UNSPECIFIED HEADACHE TYPE: ICD-10-CM

## 2022-03-29 PROBLEM — E06.3 HYPOTHYROIDISM DUE TO HASHIMOTO'S THYROIDITIS: Status: ACTIVE | Noted: 2020-10-19

## 2022-03-29 LAB
ALBUMIN UR-MCNC: NEGATIVE MG/DL
ANION GAP SERPL CALCULATED.3IONS-SCNC: 5 MMOL/L (ref 3–14)
APPEARANCE UR: CLEAR
BASOPHILS # BLD AUTO: 0 10E3/UL (ref 0–0.2)
BASOPHILS NFR BLD AUTO: 0 %
BILIRUB UR QL STRIP: NEGATIVE
BUN SERPL-MCNC: 9 MG/DL (ref 7–30)
CALCIUM SERPL-MCNC: 8.3 MG/DL (ref 8.5–10.1)
CHLORIDE BLD-SCNC: 109 MMOL/L (ref 94–109)
CO2 SERPL-SCNC: 25 MMOL/L (ref 20–32)
COLOR UR AUTO: NORMAL
CREAT SERPL-MCNC: 1.05 MG/DL (ref 0.66–1.25)
EOSINOPHIL # BLD AUTO: 0 10E3/UL (ref 0–0.7)
EOSINOPHIL NFR BLD AUTO: 0 %
ERYTHROCYTE [DISTWIDTH] IN BLOOD BY AUTOMATED COUNT: 12.4 % (ref 10–15)
GFR SERPL CREATININE-BSD FRML MDRD: >90 ML/MIN/1.73M2
GLUCOSE BLD-MCNC: 113 MG/DL (ref 70–99)
GLUCOSE BLDC GLUCOMTR-MCNC: 100 MG/DL (ref 70–99)
GLUCOSE UR STRIP-MCNC: NEGATIVE MG/DL
HCT VFR BLD AUTO: 38.1 % (ref 40–53)
HGB BLD-MCNC: 13.3 G/DL (ref 13.3–17.7)
HGB UR QL STRIP: NEGATIVE
HOLD SPECIMEN: NORMAL
IMM GRANULOCYTES # BLD: 0 10E3/UL
IMM GRANULOCYTES NFR BLD: 0 %
KETONES UR STRIP-MCNC: NEGATIVE MG/DL
LEUKOCYTE ESTERASE UR QL STRIP: NEGATIVE
LYMPHOCYTES # BLD AUTO: 1.9 10E3/UL (ref 0.8–5.3)
LYMPHOCYTES NFR BLD AUTO: 28 %
MCH RBC QN AUTO: 30.3 PG (ref 26.5–33)
MCHC RBC AUTO-ENTMCNC: 34.9 G/DL (ref 31.5–36.5)
MCV RBC AUTO: 87 FL (ref 78–100)
MONOCYTES # BLD AUTO: 0.5 10E3/UL (ref 0–1.3)
MONOCYTES NFR BLD AUTO: 7 %
NEUTROPHILS # BLD AUTO: 4.3 10E3/UL (ref 1.6–8.3)
NEUTROPHILS NFR BLD AUTO: 65 %
NITRATE UR QL: NEGATIVE
NRBC # BLD AUTO: 0 10E3/UL
NRBC BLD AUTO-RTO: 0 /100
PH UR STRIP: 7 [PH] (ref 5–7)
PLATELET # BLD AUTO: 183 10E3/UL (ref 150–450)
POTASSIUM BLD-SCNC: 3.5 MMOL/L (ref 3.4–5.3)
RBC # BLD AUTO: 4.39 10E6/UL (ref 4.4–5.9)
RBC URINE: <1 /HPF
SODIUM SERPL-SCNC: 139 MMOL/L (ref 133–144)
SP GR UR STRIP: 1.01 (ref 1–1.03)
UROBILINOGEN UR STRIP-MCNC: NORMAL MG/DL
WBC # BLD AUTO: 6.8 10E3/UL (ref 4–11)
WBC URINE: 0 /HPF

## 2022-03-29 PROCEDURE — 81003 URINALYSIS AUTO W/O SCOPE: CPT | Performed by: EMERGENCY MEDICINE

## 2022-03-29 PROCEDURE — 36415 COLL VENOUS BLD VENIPUNCTURE: CPT | Performed by: EMERGENCY MEDICINE

## 2022-03-29 PROCEDURE — 96360 HYDRATION IV INFUSION INIT: CPT

## 2022-03-29 PROCEDURE — 99284 EMERGENCY DEPT VISIT MOD MDM: CPT | Mod: 25

## 2022-03-29 PROCEDURE — 250N000013 HC RX MED GY IP 250 OP 250 PS 637: Performed by: EMERGENCY MEDICINE

## 2022-03-29 PROCEDURE — 70450 CT HEAD/BRAIN W/O DYE: CPT

## 2022-03-29 PROCEDURE — 258N000003 HC RX IP 258 OP 636: Performed by: EMERGENCY MEDICINE

## 2022-03-29 PROCEDURE — 85041 AUTOMATED RBC COUNT: CPT | Performed by: EMERGENCY MEDICINE

## 2022-03-29 PROCEDURE — 82310 ASSAY OF CALCIUM: CPT | Performed by: EMERGENCY MEDICINE

## 2022-03-29 RX ORDER — LIDOCAINE 40 MG/G
CREAM TOPICAL
Status: DISCONTINUED | OUTPATIENT
Start: 2022-03-29 | End: 2022-03-29 | Stop reason: HOSPADM

## 2022-03-29 RX ORDER — ACETAMINOPHEN 500 MG
1000 TABLET ORAL ONCE
Status: COMPLETED | OUTPATIENT
Start: 2022-03-29 | End: 2022-03-29

## 2022-03-29 RX ORDER — LEVOTHYROXINE SODIUM 100 UG/1
TABLET ORAL
COMMUNITY
Start: 2022-03-11

## 2022-03-29 RX ADMIN — SODIUM CHLORIDE 1000 ML: 9 INJECTION, SOLUTION INTRAVENOUS at 14:05

## 2022-03-29 RX ADMIN — ACETAMINOPHEN 1000 MG: 500 TABLET, FILM COATED ORAL at 14:05

## 2022-03-29 NOTE — ED TRIAGE NOTES
Dizziness and headaches x 3 weeks. Endorses nausea and vomiting this AM. Pt states he gets so dizzy he has a hard time ambulating. This is the first time he has experienced these symptoms. Pt also reports increased urinary frequency and BM's more often getting up several times in the night. He states his abdomen bloats then he has to go to the bathroom.  used for triage, A&O x 4 with VSS on RA.

## 2022-03-29 NOTE — ED PROVIDER NOTES
[facility] Emergency Medicine Note:    History     Chief Complaint:  Dizziness, Nausea & Vomiting, and Headache    A phone   was used obtain the above history as well as to explain diagnosis, plan of treatment, reasons to return to the emergency department and appropriate follow up.      HPI: Teddy Fisher is a 41 year old male who presents for evaluation of dizziness and headache for 2-3 weeks.  He states that the headache is always there.  It is diffuse and throughout his head.  There is not worse in any particular point in the day.  He has taken ibuprofen for it but states that he has not taken any in the last week.  He reports that the pain is currently a 3 out of 10.  He denies any visual changes, focal weakness, numbness or tingling.  He reports that he has had some nausea.  He had a emesis x1 today.  He reports that he has had some lightheadedness.  He denies feeling dizzy.  The lightheadedness occurs after he bends over and stands up.  He has not felt like passing out.  He also reports increased urinary frequency starting a couple of months ago.  He denies any pain with urination.  Denies any other significant changes in his lifestyle.  He denies any trauma.  No one else in his family is having similar symptoms.  He has not had any fever.    Today he called to make an appointment in primary care clinic but was referred to the emergency department instead.          Allergies:  No Known Allergies    Medications:    HYDROcodone-acetaminophen (NORCO) 5-325 MG per tablet  ibuprofen (ADVIL/MOTRIN) 600 MG tablet  levothyroxine (SYNTHROID/LEVOTHROID) 100 MCG tablet  tobramycin (TOBREX) 0.3 % ophthalmic solution        Problem List:    Patient Active Problem List    Diagnosis Date Noted     Hypothyroidism due to Hashimoto's thyroiditis 10/19/2020     Priority: Medium       Past Medical History:    Past Medical History:   Diagnosis Date     History of blood transfusion        Past Surgical  History:    Past Surgical History:   Procedure Laterality Date     ARTHROSCOPY SHOULDER, OPEN DISTAL CLAVICLE REPAIR, COMBINED Left 4/17/2017    Procedure: COMBINED ARTHROSCOPY SHOULDER, OPEN DISTAL CLAVICLE RESECTION;  Left shoulder examination under anesthesia with left shoulder arthroscopy, arthroscopic subacromial decompression, mini open distal clavicle excision;  Surgeon: Eyal Billy MD;  Location: [facility] OR     COSMETIC SURGERY      facial reconstruction after trauma       Family History:    No family history on file.    Social History:  Social History     Tobacco Use     Smoking status: Never Smoker     Smokeless tobacco: Never Used   Substance Use Topics     Alcohol use: No     Drug use: No     No one else has similar symptoms at home.  Review of Systems  See HPI, a 10 point review of systems was performed and is otherwise negative except as noted in HPI.     Physical Exam   Vital signs:  Patient Vitals for the past 24 hrs:   BP Temp Temp src Pulse Resp SpO2   03/29/22 1158 (!) 142/85 98.7  F (37.1  C) Temporal 78 16 97 %       Physical Exam:    HENT: TM's clear bilaterally.    Mouth/Throat:  Oral mucosa moist.    Eyes: Conjunctivae are normal. No scleral icterus.  Neck: Neck supple. No cervical adenopathy. No meningismus.   Cardiovascular: Normal rate, regular rhythm and intact distal pulses.    Pulmonary/Chest: Effort normal and breath sounds normal.   Abdominal: Soft.  No distension. There is no tenderness.   Musculoskeletal:  No edema, No calf tenderness  Neurological:Alert and oriented. Coordination normal. Cranial nerves II-XII intact. Upper and lower extremity strength intact throughout. Light touch sensation intact throughout. Finger-Nose-Finger without dysmetria.Normal, steady gait.   Skin: Skin is warm and dry.   Psychiatric: Normal mood and affect.         Emergency Department Course       Imaging:  CT Head w/o Contrast   Final Result   IMPRESSION:  Normal head CT.          Radiation dose for  this scan was reduced using automated exposure   control, adjustment of the mA and/or kV according to patient size, or   iterative reconstruction technique.      ALLEN NELSON MD            SYSTEM ID:  [id number]          Laboratory:  Labs Ordered and Resulted from Time of ED Arrival to Time of ED Departure   BASIC METABOLIC PANEL - Abnormal       Result Value    Sodium 139      Potassium 3.5      Chloride 109      Carbon Dioxide (CO2) 25      Anion Gap 5      Urea Nitrogen 9      Creatinine 1.05      Calcium 8.3 (*)     Glucose 113 (*)     GFR Estimate >90     CBC WITH PLATELETS AND DIFFERENTIAL - Abnormal    WBC Count 6.8      RBC Count 4.39 (*)     Hemoglobin 13.3      Hematocrit 38.1 (*)     MCV 87      MCH 30.3      MCHC 34.9      RDW 12.4      Platelet Count 183      % Neutrophils 65      % Lymphocytes 28      % Monocytes 7      % Eosinophils 0      % Basophils 0      % Immature Granulocytes 0      NRBCs per 100 WBC 0      Absolute Neutrophils 4.3      Absolute Lymphocytes 1.9      Absolute Monocytes 0.5      Absolute Eosinophils 0.0      Absolute Basophils 0.0      Absolute Immature Granulocytes 0.0      Absolute NRBCs 0.0     GLUCOSE BY METER - Abnormal    GLUCOSE BY METER POCT 100 (*)    ROUTINE UA WITH MICROSCOPIC REFLEX TO CULTURE - Normal    Color Urine Straw      Appearance Urine Clear      Glucose Urine Negative      Bilirubin Urine Negative      Ketones Urine Negative      Specific Gravity Urine 1.007      Blood Urine Negative      pH Urine 7.0      Protein Albumin Urine Negative      Urobilinogen Urine Normal      Nitrite Urine Negative      Leukocyte Esterase Urine Negative      RBC Urine <1      WBC Urine 0     GLUCOSE MONITOR NURSING POCT         Interventions:  Medications   lidocaine 1 % 0.1-1 mL (has no administration in time range)   lidocaine (LMX4) cream (has no administration in time range)   sodium chloride (PF) 0.9% PF flush 3 mL (has no administration in time range)   sodium chloride  (PF) 0.9% PF flush 3 mL (has no administration in time range)   0.9% sodium chloride BOLUS (0 mLs Intravenous Stopped 3/29/22 1505)   acetaminophen (TYLENOL) tablet 1,000 mg (1,000 mg Oral Given 3/29/22 1405)       Emergency Department Course:  I performed the above history and physical examination.   See above for ED evaluation and  Intervention  I explained the plan for treatment, follow up and indications for return to the ED.     Impression & Plan      CMS Diagnoses: none       Medical Decision Making:  Teddy Fisher is a 41 year old male who presents with 2 to 3 weeks of daily headaches.  Initially there is concern about dizziness however it appears to be episodic lightheadedness after bending over.  He is neurologically intact.  Labs were as noted above and unremarkable the exception of a mildly elevated glucose.  This was not a fasting glucose.  Urine was also negative.  He received normal saline and Tylenol with resolution of his symptoms.  With reasonable clinical certainty ability safe for discharge for ongoing evaluation and management as an outpatient.  He understands to return to the emergency department with new or worsening symptoms.  He did not feel that he needed any medications at home including something for nausea.    Critical Care time:  none    Diagnosis:    ICD-10-CM    1. Nonintractable episodic headache, unspecified headache type  R51.9        Disposition:  discharged to home with wife      Discharge Medications:  New Prescriptions    No medications on file          Sonia Grant MD  03/29/22 6246       Sonia Grant MD  03/29/22 3551

## 2022-03-29 NOTE — DISCHARGE INSTRUCTIONS
Drink plenty of fluids to maintain hydration.     Discharge Instructions  Headache    You were seen today for a headache. Headaches may be caused by many different things such as muscle tension, sinus inflammation, anxiety and stress, having too little sleep, too much alcohol, some medical conditions or injury. You may have a migraine, which is caused by changes in the blood vessels in your head.  At this time your provider does not find that your headache is a sign of anything dangerous or life-threatening.  However, sometimes the signs of serious illness do not show up right away.      Generally, every Emergency Department visit should have a follow-up clinic visit with either a primary or a specialty clinic/provider. Please follow-up as instructed by your emergency provider today.    Return to the Emergency Department if:  You get a new fever of 100.4 F or higher.  Your headache gets much worse.  You get a stiff neck with your headache.  You get a new headache that is significantly different or worse than headaches you have had before.  You are vomiting (throwing up) and cannot keep food or water down.  You have blurry or double vision or other problems with your eyes.  You have a new weakness on one side of your body.  You have difficulty with balance which is new.  You or your family thinks you are confused.  You have a seizure.    What can I do to help myself?  Pain medications - You may take a pain medication such as Tylenol  (acetaminophen), Advil , Motrin  (ibuprofen) or Aleve  (naproxen).  Take a pain reliever as soon as you notice symptoms.  Starting medications as soon as you start to have symptoms may lessen the amount of pain you have.  Relaxing in a quiet, dark room may help.  Get enough sleep and eat meals regularly.  You may need to watch for certain foods or other things which may trigger your headaches.  Keeping a journal of your headaches and possible triggers may help you and your primary  provider to identify things which you should avoid which may be causing your headaches.  If you were given a prescription for medicine here today, be sure to read all of the information (including the package insert) that comes with your prescription.  This will include important information about the medicine, its side effects, and any warnings that you need to know about.  The pharmacist who fills the prescription can provide more information and answer questions you may have about the medicine.  If you have questions or concerns that the pharmacist cannot address, please call or return to the Emergency Department.   Remember that you can always come back to the Emergency Department if you are not able to see your regular provider in the amount of time listed above, if you get any new symptoms, or if there is anything that worries you.

## 2022-09-11 ENCOUNTER — HEALTH MAINTENANCE LETTER (OUTPATIENT)
Age: 42
End: 2022-09-11

## 2023-05-06 ENCOUNTER — HEALTH MAINTENANCE LETTER (OUTPATIENT)
Age: 43
End: 2023-05-06

## 2023-12-03 ENCOUNTER — HOSPITAL ENCOUNTER (EMERGENCY)
Facility: CLINIC | Age: 43
Discharge: HOME | End: 2023-12-03
Attending: EMERGENCY MEDICINE | Admitting: EMERGENCY MEDICINE
Payer: COMMERCIAL

## 2023-12-03 VITALS
TEMPERATURE: 98.5 F | RESPIRATION RATE: 18 BRPM | SYSTOLIC BLOOD PRESSURE: 141 MMHG | OXYGEN SATURATION: 100 % | DIASTOLIC BLOOD PRESSURE: 80 MMHG | HEART RATE: 79 BPM

## 2023-12-03 DIAGNOSIS — J01.00 ACUTE MAXILLARY SINUSITIS, RECURRENCE NOT SPECIFIED: ICD-10-CM

## 2023-12-03 DIAGNOSIS — J02.9 PHARYNGITIS, UNSPECIFIED ETIOLOGY: ICD-10-CM

## 2023-12-03 LAB
FLUAV RNA SPEC QL NAA+PROBE: NEGATIVE
FLUBV RNA RESP QL NAA+PROBE: NEGATIVE
GROUP A STREP BY PCR: NOT DETECTED
RSV RNA SPEC NAA+PROBE: NEGATIVE
SARS-COV-2 RNA RESP QL NAA+PROBE: NEGATIVE

## 2023-12-03 PROCEDURE — 99283 EMERGENCY DEPT VISIT LOW MDM: CPT

## 2023-12-03 PROCEDURE — 87637 SARSCOV2&INF A&B&RSV AMP PRB: CPT | Performed by: EMERGENCY MEDICINE

## 2023-12-03 PROCEDURE — 87651 STREP A DNA AMP PROBE: CPT | Performed by: EMERGENCY MEDICINE

## 2023-12-03 PROCEDURE — 250N000013 HC RX MED GY IP 250 OP 250 PS 637: Performed by: EMERGENCY MEDICINE

## 2023-12-03 RX ORDER — ACETAMINOPHEN 325 MG/1
975 TABLET ORAL ONCE
Status: COMPLETED | OUTPATIENT
Start: 2023-12-03 | End: 2023-12-03

## 2023-12-03 RX ADMIN — Medication 10 MG: at 10:24

## 2023-12-03 RX ADMIN — ACETAMINOPHEN 975 MG: 325 TABLET, FILM COATED ORAL at 10:25

## 2023-12-03 NOTE — ED PROVIDER NOTES
History     Chief Complaint:  Pharyngitis       HPI   Teddy P Roopa Fisher is a 43 year old male who presents with sore throat.  Patient states his symptoms have been present for a week.  He also reports rhinorrhea that has become more and more yellow over the course of that time with associated maxillary sinus pain.  Denies fever.  Denies difficulty swallowing or difficulty breathing.  Denies ear pain denies other medical problems.  Denies known history of seasonal allergies.  He states he does not have a primary care provider.      Independent Historian:   None - Patient Only    Review of External Notes:   None      Medications:    amoxicillin-clavulanate (AUGMENTIN) 875-125 MG tablet  HYDROcodone-acetaminophen (NORCO) 5-325 MG per tablet  ibuprofen (ADVIL/MOTRIN) 600 MG tablet  levothyroxine (SYNTHROID/LEVOTHROID) 100 MCG tablet  tobramycin (TOBREX) 0.3 % ophthalmic solution        Past Medical History:    Past Medical History:   Diagnosis Date    History of blood transfusion        Past Surgical History:    Past Surgical History:   Procedure Laterality Date    ARTHROSCOPY SHOULDER, OPEN DISTAL CLAVICLE REPAIR, COMBINED Left 4/17/2017    Procedure: COMBINED ARTHROSCOPY SHOULDER, OPEN DISTAL CLAVICLE RESECTION;  Left shoulder examination under anesthesia with left shoulder arthroscopy, arthroscopic subacromial decompression, mini open distal clavicle excision;  Surgeon: Eyal Billy MD;  Location: [facility] OR    COSMETIC SURGERY      facial reconstruction after trauma        Physical Exam   Patient Vitals for the past 24 hrs:   BP Temp Temp src Pulse Resp SpO2   12/03/23 0913 (!) 141/80 98.5  F (36.9  C) Temporal 79 18 99 %        Physical Exam  General: Sitting on the ED bed  HEENT: Normocephalic, atraumatic, posterior pharynx with mild injection and no uvular deviation, no stridor, mild anterior cervical  lymphadenopathy  Cardiac: Warm and well perfused  Pulm: Breathing comfortably, no accessory  muscle usage, no conversational dyspnea  MSK: No bony deformities  Skin: Warm and dry  Neuro: Moves all extremities  Psych: Pleasant mood and affect      Emergency Department Course       Laboratory:  Labs Ordered and Resulted from Time of ED Arrival to Time of ED Departure   INFLUENZA A/B, RSV, & SARS-COV2 PCR - Normal       Result Value    Influenza A PCR Negative      Influenza B PCR Negative      RSV PCR Negative      SARS CoV2 PCR Negative     GROUP A STREPTOCOCCUS PCR THROAT SWAB - Normal    Group A strep by PCR Not Detected          Procedures   None    Emergency Department Course & Assessments:             Interventions:  Medications   dexAMETHasone (DECADRON) alcohol-free oral solution 10 mg (10 mg Oral $Given 12/3/23 1024)   acetaminophen (TYLENOL) tablet 975 mg (975 mg Oral $Given 12/3/23 1025)        Assessments:  1020 initial evaluation and discussion of the plan    Independent Interpretation (X-rays, CTs, rhythm strip):  None    Consultations/Discussion of Management or Tests:  None        Social Determinants of Health affecting care:   None    Disposition:  The patient was discharged to home.     Impression & Plan    CMS Diagnoses: None      Medical Decision Makin-year-old male presents with pharyngitis and sinus pain with increasingly purulent rhinorrhea.  Strep test and viral multiplex are negative.  The patient has 1 week of symptoms and with the rhinorrhea and sinus pain, treating for bacterial sinusitis with Augmentin.  The patient was also given a dose of Decadron in the ED for his sore throat.  Primary care referral placed for follow-up versus and for ongoing care.  The patient is agreeable to plan.      Diagnosis:    ICD-10-CM    1. Acute maxillary sinusitis, recurrence not specified  J01.00 Primary Care Referral      2. Pharyngitis, unspecified etiology  J02.9 Primary Care Referral           Discharge Medications:  New Prescriptions    AMOXICILLIN-CLAVULANATE (AUGMENTIN) 875-125 MG  TABLET    Take 1 tablet by mouth 2 times daily for 7 days        12/3/2023   Dorian Rivera MD King, Colin, MD  12/03/23 9913

## 2023-12-03 NOTE — ED TRIAGE NOTES
Pt presents to the ED stating he has throat pain 9/10 for 2 weeks. Pt also states he has a cough and a lot of mucus.      Triage Assessment (Adult)       Row Name 12/03/23 0914          Triage Assessment    Airway WDL WDL        Respiratory WDL    Respiratory WDL WDL        Skin Circulation/Temperature WDL    Skin Circulation/Temperature WDL WDL        Cardiac WDL    Cardiac WDL WDL        Peripheral/Neurovascular WDL    Peripheral Neurovascular WDL WDL        Cognitive/Neuro/Behavioral WDL    Cognitive/Neuro/Behavioral WDL WDL

## 2024-04-27 ENCOUNTER — HOSPITAL ENCOUNTER (EMERGENCY)
Facility: CLINIC | Age: 44
Discharge: HOME | End: 2024-04-27
Attending: EMERGENCY MEDICINE | Admitting: EMERGENCY MEDICINE
Payer: COMMERCIAL

## 2024-04-27 ENCOUNTER — APPOINTMENT (OUTPATIENT)
Dept: CT IMAGING | Facility: CLINIC | Age: 44
End: 2024-04-27
Attending: EMERGENCY MEDICINE
Payer: COMMERCIAL

## 2024-04-27 VITALS
SYSTOLIC BLOOD PRESSURE: 121 MMHG | DIASTOLIC BLOOD PRESSURE: 81 MMHG | RESPIRATION RATE: 20 BRPM | OXYGEN SATURATION: 100 % | BODY MASS INDEX: 29.41 KG/M2 | HEART RATE: 72 BPM | HEIGHT: 67 IN | TEMPERATURE: 97.7 F | WEIGHT: 187.39 LBS

## 2024-04-27 DIAGNOSIS — R09.89 PHLEGM IN THROAT: ICD-10-CM

## 2024-04-27 DIAGNOSIS — J02.9 SORE THROAT: ICD-10-CM

## 2024-04-27 LAB
ANION GAP SERPL CALCULATED.3IONS-SCNC: 12 MMOL/L (ref 7–15)
BASOPHILS # BLD AUTO: 0 10E3/UL (ref 0–0.2)
BASOPHILS NFR BLD AUTO: 1 %
BUN SERPL-MCNC: 14.4 MG/DL (ref 6–20)
CALCIUM SERPL-MCNC: 8.6 MG/DL (ref 8.6–10)
CHLORIDE SERPL-SCNC: 102 MMOL/L (ref 98–107)
CREAT SERPL-MCNC: 0.82 MG/DL (ref 0.67–1.17)
DEPRECATED HCO3 PLAS-SCNC: 22 MMOL/L (ref 22–29)
EGFRCR SERPLBLD CKD-EPI 2021: >90 ML/MIN/1.73M2
EOSINOPHIL # BLD AUTO: 0.2 10E3/UL (ref 0–0.7)
EOSINOPHIL NFR BLD AUTO: 2 %
ERYTHROCYTE [DISTWIDTH] IN BLOOD BY AUTOMATED COUNT: 12.8 % (ref 10–15)
FLUAV RNA SPEC QL NAA+PROBE: NEGATIVE
FLUBV RNA RESP QL NAA+PROBE: NEGATIVE
GLUCOSE SERPL-MCNC: 98 MG/DL (ref 70–99)
GROUP A STREP BY PCR: NOT DETECTED
HCT VFR BLD AUTO: 40.5 % (ref 40–53)
HGB BLD-MCNC: 14.4 G/DL (ref 13.3–17.7)
IMM GRANULOCYTES # BLD: 0 10E3/UL
IMM GRANULOCYTES NFR BLD: 0 %
LYMPHOCYTES # BLD AUTO: 1.6 10E3/UL (ref 0.8–5.3)
LYMPHOCYTES NFR BLD AUTO: 19 %
MCH RBC QN AUTO: 31 PG (ref 26.5–33)
MCHC RBC AUTO-ENTMCNC: 35.6 G/DL (ref 31.5–36.5)
MCV RBC AUTO: 87 FL (ref 78–100)
MONOCYTES # BLD AUTO: 0.6 10E3/UL (ref 0–1.3)
MONOCYTES NFR BLD AUTO: 7 %
NEUTROPHILS # BLD AUTO: 6.2 10E3/UL (ref 1.6–8.3)
NEUTROPHILS NFR BLD AUTO: 71 %
NRBC # BLD AUTO: 0 10E3/UL
NRBC BLD AUTO-RTO: 0 /100
PLATELET # BLD AUTO: 172 10E3/UL (ref 150–450)
POTASSIUM SERPL-SCNC: 3.5 MMOL/L (ref 3.4–5.3)
RBC # BLD AUTO: 4.64 10E6/UL (ref 4.4–5.9)
RSV RNA SPEC NAA+PROBE: NEGATIVE
SARS-COV-2 RNA RESP QL NAA+PROBE: NEGATIVE
SODIUM SERPL-SCNC: 136 MMOL/L (ref 135–145)
WBC # BLD AUTO: 8.7 10E3/UL (ref 4–11)

## 2024-04-27 PROCEDURE — 87205 SMEAR GRAM STAIN: CPT | Performed by: EMERGENCY MEDICINE

## 2024-04-27 PROCEDURE — 70491 CT SOFT TISSUE NECK W/DYE: CPT

## 2024-04-27 PROCEDURE — 85004 AUTOMATED DIFF WBC COUNT: CPT | Performed by: EMERGENCY MEDICINE

## 2024-04-27 PROCEDURE — 250N000011 HC RX IP 250 OP 636: Performed by: EMERGENCY MEDICINE

## 2024-04-27 PROCEDURE — 87651 STREP A DNA AMP PROBE: CPT | Performed by: EMERGENCY MEDICINE

## 2024-04-27 PROCEDURE — 36415 COLL VENOUS BLD VENIPUNCTURE: CPT | Performed by: EMERGENCY MEDICINE

## 2024-04-27 PROCEDURE — 87637 SARSCOV2&INF A&B&RSV AMP PRB: CPT | Performed by: EMERGENCY MEDICINE

## 2024-04-27 PROCEDURE — 87181 SC STD AGAR DILUTION PER AGT: CPT | Mod: 59 | Performed by: EMERGENCY MEDICINE

## 2024-04-27 PROCEDURE — 99285 EMERGENCY DEPT VISIT HI MDM: CPT | Mod: 25

## 2024-04-27 PROCEDURE — 80048 BASIC METABOLIC PNL TOTAL CA: CPT | Performed by: EMERGENCY MEDICINE

## 2024-04-27 PROCEDURE — 250N000009 HC RX 250: Performed by: EMERGENCY MEDICINE

## 2024-04-27 RX ORDER — IOPAMIDOL 755 MG/ML
500 INJECTION, SOLUTION INTRAVASCULAR ONCE
Status: COMPLETED | OUTPATIENT
Start: 2024-04-27 | End: 2024-04-27

## 2024-04-27 RX ADMIN — IOPAMIDOL 90 ML: 755 INJECTION, SOLUTION INTRAVENOUS at 08:27

## 2024-04-27 RX ADMIN — SODIUM CHLORIDE 65 ML: 9 INJECTION, SOLUTION INTRAVENOUS at 08:27

## 2024-04-27 ASSESSMENT — COLUMBIA-SUICIDE SEVERITY RATING SCALE - C-SSRS
6. HAVE YOU EVER DONE ANYTHING, STARTED TO DO ANYTHING, OR PREPARED TO DO ANYTHING TO END YOUR LIFE?: NO
1. IN THE PAST MONTH, HAVE YOU WISHED YOU WERE DEAD OR WISHED YOU COULD GO TO SLEEP AND NOT WAKE UP?: NO
2. HAVE YOU ACTUALLY HAD ANY THOUGHTS OF KILLING YOURSELF IN THE PAST MONTH?: NO

## 2024-04-27 ASSESSMENT — ACTIVITIES OF DAILY LIVING (ADL)
ADLS_ACUITY_SCORE: 35
ADLS_ACUITY_SCORE: 33

## 2024-04-27 NOTE — ED PROVIDER NOTES
"  History     Chief Complaint:  No chief complaint on file.       HPI   Teddy Fisher is a 43 year old male who presents due to sore throat.  The patient states that in December, he was seen in the emergency department where he had negative strep and COVID test.  He then traveled to [facility] where a sputum culture was performed.  The patient's son shows me the results of this and it is notable for Moraxella catarrhalis which was sensitive to Augmentin, Unasyn, Rocephin, Levaquin.  It was resistant to Bactrim and tetracycline.  The patient states he was given 15 days of an antibiotic twice daily and that seemed to resolve his symptoms.  He also notes that he was given an antibiotic shot for this but is unsure of exactly what it was    The patient notes that over the last 3 weeks he has had increased sore throat as well as increased phlegm production.  He thinks this may be coming from his nose rather than being something he is coughing up.  He denies fever.    The patient notes that he has an upcoming appointment with his [facility] primary care physician.  He is primarily concerned about rechecking his sputum as well as the discomfort in his throat.  He notes that he has a history of thyroid issues and is concerned about this because he had some part of the left side of his thyroid removed.    Independent Historian:    Son - They report and corroborate the above HPI.    Review of External Notes:  Sputum culture report with results as noted above in the HPI.    Allergies:  No Known Allergies     Physical Exam   Patient Vitals for the past 24 hrs:   BP Temp Temp src Pulse Resp SpO2 Height Weight   04/27/24 1103 121/81 -- -- 72 20 100 % -- --   04/27/24 0705 134/80 97.7  F (36.5  C) Oral 67 18 99 % 1.7 m (5' 6.93\") 85 kg (187 lb 6.3 oz)        Physical Exam  Constitutional: Vital signs reviewed as above.   Eyes: PEERL, EOMI B/L  Neck: No JVD noted. FROM   Cardiovascular: normal rate, Regular rhythm and " normal heart sounds.  No murmur heard. Equal B/L peripheral pulses.  Pulmonary/Chest: Effort normal and breath sounds normal. No respiratory distress. Patient has no wheezes. Patient has no rales.   Gastrointestinal: Soft. There is no tenderness.   Musculoskeletal/Extremities: No pitting edema noted. Normal tone.  Neurological: Alert  Skin: Skin is warm and dry. There is no diaphoresis noted.   Psychiatric: The patient appears calm.   Head: No external signs of trauma. No lesions noted.  ENT:   Ears: Normal B/L TM and external canals   Nose: Noncongested, no exudates. No rhinorrhea. No FB noted   Mouth/Throat:     Mucous membranes are moist and normal.     No Oropharyngeal exudate. No oropharyngeal erythema noted.    No tonsilar swelling noted.     No uvular deviation noted.    No swelling noted on the floor of the mouth  Lymphatic: No posterior cervical LAD noted.      Emergency Department Course     Laboratory: Imaging:   Labs Ordered and Resulted from Time of ED Arrival to Time of ED Departure   BASIC METABOLIC PANEL - Normal       Result Value    Sodium 136      Potassium 3.5      Chloride 102      Carbon Dioxide (CO2) 22      Anion Gap 12      Urea Nitrogen 14.4      Creatinine 0.82      GFR Estimate >90      Calcium 8.6      Glucose 98     INFLUENZA A/B, RSV, & SARS-COV2 PCR - Normal    Influenza A PCR Negative      Influenza B PCR Negative      RSV PCR Negative      SARS CoV2 PCR Negative     GROUP A STREPTOCOCCUS PCR THROAT SWAB - Normal    Group A strep by PCR Not Detected     CBC WITH PLATELETS AND DIFFERENTIAL    WBC Count 8.7      RBC Count 4.64      Hemoglobin 14.4      Hematocrit 40.5      MCV 87      MCH 31.0      MCHC 35.6      RDW 12.8      Platelet Count 172      % Neutrophils 71      % Lymphocytes 19      % Monocytes 7      % Eosinophils 2      % Basophils 1      % Immature Granulocytes 0      NRBCs per 100 WBC 0      Absolute Neutrophils 6.2      Absolute Lymphocytes 1.6      Absolute Monocytes  0.6      Absolute Eosinophils 0.2      Absolute Basophils 0.0      Absolute Immature Granulocytes 0.0      Absolute NRBCs 0.0     RESPIRATORY AEROBIC BACTERIAL CULTURE     Soft tissue neck CT w contrast   Final Result   IMPRESSION:    1.  No localized inflammation, mass, or lymphadenopathy.              Procedures       Emergency Department Course & Assessments:    Interventions:  Medications   CT scan flush (65 mLs Intravenous $Given 4/27/24 0827)   iopamidol (ISOVUE-370) solution 500 mL (90 mLs Intravenous $Given 4/27/24 0827)        Assessments, Independent Interpretation, Consult/Discussion of ManagementTests:  ED Course as of 04/27/24 1119   Sat Apr 27, 2024   1027 Rechecked and updated.       Social Determinants of Health affecting care:  None    Disposition:  See ED Course and MDM    Impression & Plan    CMS Diagnoses: None    Code Status: Prior         Medical Decision Making:  This 43-year-old male patient presents to the ED due to concerns for sore throat.  Please see the HPI and exam for specifics.  The patient notes that in December he traveled back to [facility] where a sputum culture was performed due to a perception of phlegm in his throat that demonstrated Moraxella catarrhalis.  He was given antibiotics and perceived improvement.  Over the last 3 weeks his symptoms seem to have returned.  He presents to the ED primarily concerned about this asking for a sputum culture as well as for a CT of his face/neck due to perception of discomfort and prior thyroid related issues.    A broad differential was considered including laboratory studies, sputum culture, and CT imaging.  Fortunately, strep and COVID/flu/RSV testing are negative.  Blood work is reassuring.  CT of the patient's soft tissues of his neck is also reassuring.  Sputum culture has not returned yet.  I feel that discharge to follow with his [facility] endocrinologist is reasonable and the patient states he will also ask them about  establishing primary care with [facility] as well.  Should the sputum culture be positive, the patient should be contacted by [facility].  Anticipatory guidance given to patient and son prior to discharge.      Critical Care:  None.    Diagnosis:    ICD-10-CM    1. Phlegm in throat  R09.89       2. Sore throat  J02.9            Discharge Medications:  Discharge Medication List as of 4/27/2024 10:55 AM             4/27/2024   James Davis DO Burns, Bradley Joseph, DO  04/27/24 1122

## 2024-04-27 NOTE — ED TRIAGE NOTES
Seen in [facility] for sore throat, headache, and mucus. Was treated for bacterial infection in mucus with penicillin and started another treatment but did not finish due to returning to the [place]. Symptoms have persisted since then. Endorses black sputum intermittently.

## 2024-04-27 NOTE — DISCHARGE INSTRUCTIONS
Qué vas a hacer después:  Puede usar paracetamol (Tylenol) e ibuprofeno de venta vazquez para controlar el dolor.  Acetaminofén (Tylenol): Brooten de 500 a 1000 mg por vía oral cada 6 horas según sea necesario para la fiebre o el dolor. No tome más de 4000 miligramos en total de productos que contengan paracetamol en un período de 24 horas.  Ibuprofeno: Brooten 600 miligramos por vía oral cada 6 a 8 horas según sea necesario para la fiebre o el dolor. Tómelo con alimentos o leche para evitar malestar estomacal.  Seguimiento marion se indica a continuación    Cuándo regresas:   Si tiene Dolor incontrolable, fiebres incontrolables, incapacidad para tragar, hinchazón de la kayla o cualquier otro síntoma que le preocupe, regrese al servicio de urgencias para pricilla reevaluación.    Radha por permitirnos cuidar de nasrin oliveros.

## 2024-05-01 ENCOUNTER — TELEPHONE (OUTPATIENT)
Dept: NURSING | Facility: CLINIC | Age: 44
End: 2024-05-01
Payer: COMMERCIAL

## 2024-05-01 LAB
BACTERIA SPT CULT: ABNORMAL
GRAM STAIN RESULT: ABNORMAL

## 2024-05-01 NOTE — TELEPHONE ENCOUNTER
[facility]    Reason for call: Lab Result Notification     Lab Result (including Rx patient on, if applicable).  If culture, copy of lab report at bottom.  Lab Result: See below    No antibitoic prescribed.    Creatinine Level (mg/dl)   Creatinine   Date Value Ref Range Status   04/27/2024 0.82 0.67 - 1.17 mg/dL Final   07/08/2020 0.84 0.66 - 1.25 mg/dL Final    Creatinine clearance (ml/min), if applicable    Serum creatinine: 0.82 mg/dL 04/27/24 0809  Estimated creatinine clearance: 122 mL/min     Patient's current Symptoms:   Spoke with patient. He states that his symptoms are the same and maybe a little worse.  He states that when he coughs hard there is a little bit of blood in his sputum.  States that he still has a sore throat. States that his sputum is brownish colored. Denies any new symptoms. Patient has an appointment tomorrow at 1300 with a PCP regarding this.     RN Recommendations/Instructions per [facility] ED lab result protocol:   [facility] ED lab result protocol utilized: Culture    Patient/care giver notified to contact your PCP clinic or return to the Emergency department if your:  Symptoms worsen or other concerning symptoms.    FRANCESCO IGNACIO RN

## 2024-07-13 ENCOUNTER — HEALTH MAINTENANCE LETTER (OUTPATIENT)
Age: 44
End: 2024-07-13

## 2025-03-19 ENCOUNTER — HOSPITAL ENCOUNTER (EMERGENCY)
Facility: CLINIC | Age: 45
Discharge: HOME | End: 2025-03-19
Attending: PHYSICIAN ASSISTANT | Admitting: PHYSICIAN ASSISTANT
Payer: OTHER MISCELLANEOUS

## 2025-03-19 ENCOUNTER — APPOINTMENT (OUTPATIENT)
Dept: CT IMAGING | Facility: CLINIC | Age: 45
End: 2025-03-19
Attending: PHYSICIAN ASSISTANT
Payer: OTHER MISCELLANEOUS

## 2025-03-19 VITALS
DIASTOLIC BLOOD PRESSURE: 84 MMHG | SYSTOLIC BLOOD PRESSURE: 122 MMHG | TEMPERATURE: 97.3 F | OXYGEN SATURATION: 98 % | RESPIRATION RATE: 18 BRPM | HEART RATE: 68 BPM

## 2025-03-19 DIAGNOSIS — S05.02XA BILATERAL CORNEAL ABRASIONS, INITIAL ENCOUNTER: ICD-10-CM

## 2025-03-19 DIAGNOSIS — S05.01XA BILATERAL CORNEAL ABRASIONS, INITIAL ENCOUNTER: ICD-10-CM

## 2025-03-19 PROCEDURE — 90715 TDAP VACCINE 7 YRS/> IM: CPT | Performed by: PHYSICIAN ASSISTANT

## 2025-03-19 PROCEDURE — 250N000011 HC RX IP 250 OP 636: Performed by: PHYSICIAN ASSISTANT

## 2025-03-19 PROCEDURE — 90471 IMMUNIZATION ADMIN: CPT | Performed by: PHYSICIAN ASSISTANT

## 2025-03-19 PROCEDURE — 99284 EMERGENCY DEPT VISIT MOD MDM: CPT | Mod: 25

## 2025-03-19 PROCEDURE — 70480 CT ORBIT/EAR/FOSSA W/O DYE: CPT

## 2025-03-19 PROCEDURE — 250N000009 HC RX 250: Performed by: PHYSICIAN ASSISTANT

## 2025-03-19 RX ORDER — TETRACAINE HYDROCHLORIDE 5 MG/ML
2 SOLUTION OPHTHALMIC ONCE
Status: COMPLETED | OUTPATIENT
Start: 2025-03-19 | End: 2025-03-19

## 2025-03-19 RX ORDER — ERYTHROMYCIN 5 MG/G
0.5 OINTMENT OPHTHALMIC 4 TIMES DAILY
Qty: 3.5 G | Refills: 0 | Status: SHIPPED | OUTPATIENT
Start: 2025-03-19 | End: 2025-03-26

## 2025-03-19 RX ADMIN — TETRACAINE HYDROCHLORIDE 2 DROP: 5 SOLUTION OPHTHALMIC at 20:55

## 2025-03-19 RX ADMIN — CLOSTRIDIUM TETANI TOXOID ANTIGEN (FORMALDEHYDE INACTIVATED), CORYNEBACTERIUM DIPHTHERIAE TOXOID ANTIGEN (FORMALDEHYDE INACTIVATED), BORDETELLA PERTUSSIS TOXOID ANTIGEN (GLUTARALDEHYDE INACTIVATED), BORDETELLA PERTUSSIS FILAMENTOUS HEMAGGLUTININ ANTIGEN (FORMALDEHYDE INACTIVATED), BORDETELLA PERTUSSIS PERTACTIN ANTIGEN, AND BORDETELLA PERTUSSIS FIMBRIAE 2/3 ANTIGEN 0.5 ML: 5; 2; 2.5; 5; 3; 5 INJECTION, SUSPENSION INTRAMUSCULAR at 21:54

## 2025-03-19 RX ADMIN — FLUORESCEIN SODIUM 2 STRIP: 1 STRIP OPHTHALMIC at 20:55

## 2025-03-19 ASSESSMENT — ACTIVITIES OF DAILY LIVING (ADL)
ADLS_ACUITY_SCORE: 41
ADLS_ACUITY_SCORE: 41

## 2025-03-19 ASSESSMENT — VISUAL ACUITY
OD: 20/20
OU: NORMAL
OU: NOT TESTED
OS: 20/30

## 2025-03-19 ASSESSMENT — COLUMBIA-SUICIDE SEVERITY RATING SCALE - C-SSRS
1. IN THE PAST MONTH, HAVE YOU WISHED YOU WERE DEAD OR WISHED YOU COULD GO TO SLEEP AND NOT WAKE UP?: NO
2. HAVE YOU ACTUALLY HAD ANY THOUGHTS OF KILLING YOURSELF IN THE PAST MONTH?: NO
6. HAVE YOU EVER DONE ANYTHING, STARTED TO DO ANYTHING, OR PREPARED TO DO ANYTHING TO END YOUR LIFE?: NO

## 2025-03-20 NOTE — ED TRIAGE NOTES
Patient's Mom, Yoanna, returned call regarding updated medication doses.  Yoanna confirms that Jhoan has updated his doses and is current with his medication list.     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 Pt c/o discomfort in both eyes but R>L. Redness noted to sclera. Blurred vision intermittently. ABCs intact. A&OX4.     Triage Assessment (Adult)       Row Name 03/19/25 2014          Triage Assessment    Airway WDL WDL        Respiratory WDL    Respiratory WDL WDL        Peripheral/Neurovascular WDL    Peripheral Neurovascular WDL WDL

## 2025-03-20 NOTE — ED PROVIDER NOTES
Emergency Department Note      History of Present Illness     Chief Complaint   Eye Injury      HPI History obtained using Polish video .  Teddy Fisher is a 44 year old male otherwise healthy who presents for eye irritation/injury.  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  He was lying looking up while cleaning underneath the seat and using a air blower/vacuum.  He thinks he got some debris or dust in his bilateral eyes and reports irritation and foreign body sensation.  Denies any metal that he is aware of and was not using power tools.  He does not wear contact lenses.  Reports vision seems mildly blurry bilaterally.  He is not on blood thinners.  Right is worse than his left.  Unsure of last tetanus. He uses cleaning chemicals at work but was not using any at the time this occurred.    Independent Historian   None    Review of External Notes   None available    Past Medical History     Medical History and Problem List   Past Medical History:   Diagnosis Date    History of blood transfusion        Medications   erythromycin (ROMYCIN) 5 MG/GM ophthalmic ointment  HYDROcodone-acetaminophen (NORCO) 5-325 MG per tablet  ibuprofen (ADVIL/MOTRIN) 600 MG tablet  levothyroxine (SYNTHROID/LEVOTHROID) 100 MCG tablet  tobramycin (TOBREX) 0.3 % ophthalmic solution        Surgical History   Past Surgical History:   Procedure Laterality Date    ARTHROSCOPY SHOULDER, OPEN DISTAL CLAVICLE REPAIR, COMBINED Left 4/17/2017    Procedure: COMBINED ARTHROSCOPY SHOULDER, OPEN DISTAL CLAVICLE RESECTION;  Left shoulder examination under anesthesia with left shoulder arthroscopy, arthroscopic subacromial decompression, mini open distal clavicle excision;  Surgeon: yEal Billy MD;  Location: [facility] OR    COSMETIC SURGERY      facial reconstruction after trauma       Physical Exam     Patient Vitals for the past 24 hrs:   BP Temp Temp src Pulse Resp SpO2   03/19/25 2236 -- -- -- 68 18 98 %    03/19/25 2015 122/84 97.3  F (36.3  C) Temporal 67 16 98 %     Physical Exam  General: Well appearing.  No acute distress.  HENT:  Scalp AT/NC. No facial swelling, redness, bruising or deformity.  Eyes:  PEERL, Extra occular movements intact without pain.  No proptosis.    Mild BL conjunctival irritation.  No lid swelling, crusting or discharge.    No foreign bodies present including with eversion of lids.  Exam of eye with fluoroscein dye reveals 3 small linear corneal abrasions at 6'clock position left eyet.  Single small abrasion at 3 o'clock position right eye.  Negative seidels sign BL.  No dendritic lesions, ulcers. Anterior chamber appears grossly clear w/out hyphema    Right Eye 20/20     Left Eye 20/30  CV:  Regular rate and rhythm    No pathologic murmur, rubs, or gallops.  Resp:  Breath sounds are clear bilaterally.   Non-labored.  Neuro:  Alert and Oriented.    GCS: 15    CN II-XII grossly intact. 5/5 strength BL in upper and lower extremities.  Normal finger to nose and heel to shin testing.  Skin:  No rashes or lesions.  Psych:  alert, appropriate interactions. Cooperative      Diagnostics     Imaging   CT Orbits wo Contrast   Final Result   IMPRESSION:    1.  No intraorbital metallic foreign body.   2.  Left inferior orbital rim and maxillary sinus wall fixation plates.             Independent Interpretation   None    ED Course      Medications Administered   Medications   tetracaine (PONTOCAINE) 0.5 % ophthalmic solution 2 drop (2 drops Both Eyes $Given 3/19/25 2055)   fluorescein (FUL-JESSE) ophthalmic strip 2 strip (2 strips Both Eyes $Given 3/19/25 2055)   Tdap (tetanus-diphtheria-acell pertussis) (ADACEL) injection 0.5 mL (0.5 mLs Intramuscular $Given 3/19/25 2154)       Procedures   Procedures     Discussion of Management   None    ED Course        Additional Documentation  None    Medical Decision Making / Diagnosis     CMS Diagnoses: None    MIPS       None    MDM   Patient presents with BL  eye irritation and discomfort after using vacuum/blower at work. Broad differential was considered.  The exam is significant for abnormalities on fluroscein exam BL. Consistent w/BL corneal abrasions. No foreign bodies in eyes or lids noted and orbital ct negative for fb or intra-ocular injury.  No evidence of ruptured globe,  hyphema, retro-bulbar hematoma, orbital fracture, lens dislocation, retinal detachment, or traumatic iritis.  No evidence of other serious non-traumatic eye disease such as corneal ulcer, dendritic lesions, endopthalmitis, angle closure glaucoma, etc.    Will place on ointment as below.  Tetanus updated  Discussed symptomatic treatment for pain control. He already has an appointment scheduled for tomorrow with an eye doctor he tells me which I urged him to keep for re-check.  Return to ED if new or worsening symptoms.      Disposition   The patient was discharged.     Diagnosis     ICD-10-CM    1. Bilateral corneal abrasions, initial encounter  S05.01XA     S05.02XA            Discharge Medications   Discharge Medication List as of 3/19/2025 10:30 PM        START taking these medications    Details   erythromycin (ROMYCIN) 5 MG/GM ophthalmic ointment Place 0.5 inches into both eyes 4 times daily for 7 days.Disp-3.5 g, [id number]                    Peter Mcfarlane PA-C  03/19/25 2027

## 2025-07-19 ENCOUNTER — HEALTH MAINTENANCE LETTER (OUTPATIENT)
Age: 45
End: 2025-07-19

## (undated) DEVICE — BLADE SAW OSCIL/SAG STRK MICRO 9X25X0.64MM 2296-033-522

## (undated) DEVICE — BAG CLEAR TRASH 1.3M 39X33" P4040C

## (undated) DEVICE — SLING ARM LG 79-99157

## (undated) DEVICE — PACK SHOULDER RIDGES

## (undated) DEVICE — SU VICRYL 1 CT-1 27" J341H

## (undated) DEVICE — BUR SHAVER ARTHRO 6MM OVAL H9102

## (undated) DEVICE — SU ETHILON 4-0 PS-2 18" BLACK 1667H

## (undated) DEVICE — DRSG ABDOMINAL 07 1/2X8" 7197D

## (undated) DEVICE — ESU ARTHROWAND 90DEG RF AMBIENT SUPER TURBOVAC ASHA4250-01

## (undated) DEVICE — GLOVE PROTEXIS W/NEU-THERA 7.5  2D73TE75

## (undated) DEVICE — DRAPE ARTHROSCOPY SHOULDER BEACHCHAIR 29369

## (undated) DEVICE — ARTHROSCOPIC CANNULA 5.5X70MM GRAY  9718

## (undated) DEVICE — BLADE SHAVER ARTHRO 4.2MM CUDA C9254

## (undated) DEVICE — DRSG GAUZE 4X4" TRAY

## (undated) DEVICE — ESU PENCIL W/HOLSTER E2350H

## (undated) DEVICE — Device

## (undated) DEVICE — LINEN FULL SHEET 5511

## (undated) DEVICE — GLOVE PROTEXIS POWDER FREE 8.0 ORTHOPEDIC 2D73ET80

## (undated) DEVICE — TUBING ARTHRO CONMED/LINVATEC PUMP BLUE INFLOW 10K100

## (undated) DEVICE — SOL NACL 0.9% IRRIG 3000ML BAG 2B7477

## (undated) DEVICE — DRSG ADAPTIC 3X8" 6113

## (undated) DEVICE — GLOVE PROTEXIS POWDER FREE 8.5 ORTHOPEDIC 2D73ET85

## (undated) DEVICE — ESU GROUND PAD ADULT W/CORD E7507

## (undated) DEVICE — GLOVE PROTEXIS W/NEU-THERA 8.5  2D73TE85

## (undated) DEVICE — DRAPE CONVERTORS U-DRAPE 60X72" 8476

## (undated) DEVICE — LINEN ORTHO ACL PACK 5447

## (undated) DEVICE — DRAPE STERI U 1015

## (undated) DEVICE — SU VICRYL 2-0 CT-2 27" UND J269H

## (undated) DEVICE — LINEN HALF SHEET 5512

## (undated) RX ORDER — GLYCOPYRROLATE 0.2 MG/ML
INJECTION INTRAMUSCULAR; INTRAVENOUS
Status: DISPENSED
Start: 2017-04-17

## (undated) RX ORDER — FENTANYL CITRATE 50 UG/ML
INJECTION, SOLUTION INTRAMUSCULAR; INTRAVENOUS
Status: DISPENSED
Start: 2017-04-17

## (undated) RX ORDER — PROPOFOL 10 MG/ML
INJECTION, EMULSION INTRAVENOUS
Status: DISPENSED
Start: 2017-04-17

## (undated) RX ORDER — LIDOCAINE HYDROCHLORIDE 10 MG/ML
INJECTION, SOLUTION EPIDURAL; INFILTRATION; INTRACAUDAL; PERINEURAL
Status: DISPENSED
Start: 2017-04-17

## (undated) RX ORDER — DEXAMETHASONE SODIUM PHOSPHATE 4 MG/ML
INJECTION, SOLUTION INTRA-ARTICULAR; INTRALESIONAL; INTRAMUSCULAR; INTRAVENOUS; SOFT TISSUE
Status: DISPENSED
Start: 2017-04-17

## (undated) RX ORDER — ONDANSETRON 2 MG/ML
INJECTION INTRAMUSCULAR; INTRAVENOUS
Status: DISPENSED
Start: 2017-04-17

## (undated) RX ORDER — CEFAZOLIN SODIUM 2 G/100ML
INJECTION, SOLUTION INTRAVENOUS
Status: DISPENSED
Start: 2017-04-17